# Patient Record
Sex: FEMALE | Race: WHITE | NOT HISPANIC OR LATINO | Employment: FULL TIME | RURAL
[De-identification: names, ages, dates, MRNs, and addresses within clinical notes are randomized per-mention and may not be internally consistent; named-entity substitution may affect disease eponyms.]

---

## 2018-05-04 ENCOUNTER — HISTORICAL (OUTPATIENT)
Dept: ADMINISTRATIVE | Facility: HOSPITAL | Age: 28
End: 2018-05-04

## 2018-05-08 LAB
LAB AP CLINICAL INFORMATION: NORMAL
LAB AP DIAGNOSIS - HISTORICAL: NORMAL
LAB AP GROSS PATHOLOGY - HISTORICAL: NORMAL
LAB AP SPECIMEN SUBMITTED - HISTORICAL: NORMAL

## 2022-02-08 ENCOUNTER — OFFICE VISIT (OUTPATIENT)
Dept: FAMILY MEDICINE | Facility: CLINIC | Age: 32
End: 2022-02-08
Payer: COMMERCIAL

## 2022-02-08 DIAGNOSIS — E28.2 POLYCYSTIC OVARIAN DISEASE: Primary | ICD-10-CM

## 2022-02-08 DIAGNOSIS — M06.9 RHEUMATOID ARTHRITIS INVOLVING MULTIPLE SITES, UNSPECIFIED WHETHER RHEUMATOID FACTOR PRESENT: ICD-10-CM

## 2022-02-08 DIAGNOSIS — M25.531 PAIN OF BOTH WRIST JOINTS: ICD-10-CM

## 2022-02-08 DIAGNOSIS — M25.532 PAIN OF BOTH WRIST JOINTS: ICD-10-CM

## 2022-02-08 PROCEDURE — 99214 PR OFFICE/OUTPT VISIT, EST, LEVL IV, 30-39 MIN: ICD-10-PCS | Mod: ,,, | Performed by: NURSE PRACTITIONER

## 2022-02-08 PROCEDURE — 99214 OFFICE O/P EST MOD 30 MIN: CPT | Mod: ,,, | Performed by: NURSE PRACTITIONER

## 2022-02-10 NOTE — PROGRESS NOTES
Keya Landin NP   [facility]  [address]  [place]     PATIENT NAME: Britney Wagner  : 1990  DATE: 22  MRN: [id number]      Billing Provider: Keya Landin NP  Level of Service:   Patient PCP Information     Provider PCP Type    Keya Landin NP General          Reason for Visit / Chief Complaint: Rheumatoid Arthritis (Pt states that she is having trouble with her arthritis again and is needing something to help with her pain. ) and Carpal Tunnel       Update PCP  Update Chief Complaint         History of Present Illness / Problem Focused Workflow     Britney Wagner presents to the clinic  stated she was diagnosed with RA at age 7, having trouble with arthritis, has not taken any arthritis med in years, also c/o carpal tunnel both wrists         Review of Systems     Review of Systems   Constitutional: Negative for chills, fatigue and fever.   HENT: Negative for nasal congestion, ear pain, facial swelling, hearing loss, mouth dryness, mouth sores, postnasal drip, rhinorrhea, sinus pressure/congestion and goiter.    Eyes: Negative for discharge and itching.   Respiratory: Negative for cough, shortness of breath and wheezing.    Cardiovascular: Negative for chest pain and leg swelling.   Gastrointestinal: Negative for abdominal pain, change in bowel habit and change in bowel habit.   Genitourinary: Negative for difficulty urinating, dysuria, enuresis, frequency, hematuria and urgency.   Musculoskeletal: Positive for arthralgias.   Neurological: Negative for dizziness, vertigo, syncope, weakness and headaches.   Psychiatric/Behavioral: Negative for decreased concentration.        Medical / Social / Family History     Past Medical History:   Diagnosis Date    Migraines        Past Surgical History:   Procedure Laterality Date    INDUCED       TONSILLECTOMY         Social History  Ms.      Family History  Ms.'s family history is not on file.    Medications and Allergies      Medications  No outpatient medications have been marked as taking for the 2/8/22 encounter (Office Visit) with Keya Landin NP.       Allergies  Review of patient's allergies indicates:  No Known Allergies    Physical Examination   There were no vitals filed for this visit.   Physical Exam  Constitutional:       Appearance: Normal appearance.   HENT:      Head: Normocephalic.      Right Ear: Tympanic membrane, ear canal and external ear normal.      Left Ear: Tympanic membrane, ear canal and external ear normal.      Nose: Nose normal.      Mouth/Throat:      Mouth: Mucous membranes are moist.      Pharynx: Oropharynx is clear.   Eyes:      Extraocular Movements: Extraocular movements intact.      Conjunctiva/sclera: Conjunctivae normal.      Pupils: Pupils are equal, round, and reactive to light.   Cardiovascular:      Rate and Rhythm: Normal rate and regular rhythm.      Pulses: Normal pulses.      Heart sounds: Normal heart sounds.   Pulmonary:      Effort: Pulmonary effort is normal.      Breath sounds: Normal breath sounds.   Abdominal:      General: Bowel sounds are normal.      Palpations: Abdomen is soft.   Musculoskeletal:         General: Tenderness (multiple joint pain, pain shoulders, elbows, wrist,hips, knees, also pain of wrist) present present. Normal range of motion.   Skin:     General: Skin is warm and dry.      Capillary Refill: Capillary refill takes less than 2 seconds.   Neurological:      General: No focal deficit present.      Mental Status: She is alert and oriented to person, place, and time.      Comments: Tinel signs positive, phalens positive, amanda wrists    Psychiatric:         Mood and Affect: Mood normal.         Behavior: Behavior normal.          Assessment and Plan (including Health Maintenance)      Problem List  Smart Sets  Document Outside HM   :    Plan:  will make referral for ob/gyn, rheumatologist and emg studies, meds as ordered, return to clinic in 1  monht    Rheumatoid arthritis, involving unspecified site, unspecified whether rheumatoid factor present  -     Ambulatory referral/consult to Rheumatology; Future; Expected date: 02/15/2022  -     dexamethasone injection 4 mg  -     methylPREDNISolone acetate injection 40 mg  -     methylPREDNISolone (MEDROL DOSEPACK) 4 mg tablet; use as directed  Dispense: 21 each; Refill: 0  -     meloxicam (MOBIC) 15 MG tablet; Take 1 tablet (15 mg total) by mouth once daily.  Dispense: 30 tablet; Refill: 5     PCOS (polycystic ovarian syndrome)  -     Ambulatory referral/consult to Gynecology; Future; Expected date: 02/15/2022     Pain of both wrist joints  -     Nerve conduction test; Future; Expected date: 02/15/2022        Health Maintenance Due   Topic Date Due    Hepatitis C Screening  Never done    Lipid Panel  Never done    COVID-19 Vaccine (1) Never done    HIV Screening  Never done    TETANUS VACCINE  Never done    Pap Smear  Never done    Influenza Vaccine (1) 09/01/2021       Problem List Items Addressed This Visit    None     Visit Diagnoses     Polycystic ovarian disease    -  Primary    Relevant Orders    Ambulatory referral/consult to Obstetrics / Gynecology    Pain of both wrist joints        Relevant Orders    EMG W/ ULTRASOUND AND NERVE CONDUCTION TEST 2 Extremities    Rheumatoid arthritis involving multiple sites, unspecified whether rheumatoid factor present        Relevant Orders    Ambulatory referral/consult to Rheumatology            The patient has no Health Maintenance topics of status Not Due    Procedures     Future Appointments   Date Time Provider Department Center   2/15/2022  4:15 PM [facility]        Follow up in about 1 week (around 2/15/2022).       Signature:  Keya Landin NP    Date of encounter: 2/8/22

## 2022-02-10 NOTE — PATIENT INSTRUCTIONS
Patient Education       Rheumatoid Arthritis Discharge Instructions   About this topic   Rheumatoid arthritis is also called RA. It is a long-term illness that causes problems with swelling of the lining of your joints. This leads to pain, stiffness, and problems moving. RA is caused by your own body attacking the joints. This is known as an autoimmune disorder. Doctors don't know what triggers each attack. RA can cause bone and joint damage after some time.  Treatment includes drugs and therapy. Surgery may be needed if the bones and joints have been badly damaged.  What care is needed at home?   · Ask your doctor what you need to do when you go home. Make sure you ask questions if you do not understand what the doctor says. This way you will know what you need to do.  · Your doctor may tell you to use crutches, a walker, or a cane if your legs or knees are affected.  · If the pain is severe, your doctor may inject a drug into the affected joint or joints to treat the swelling. Keep the injection site clean and dry for 24 hours. You may feel the effect of the drug after 1 to 7 days.  · Your doctor may tell you to use ice or heat to help with pain.  ? Place an ice pack or a bag of frozen peas wrapped in a towel over the painful part. Never put ice right on the skin. Do not leave the ice on more than 10 to 15 minutes at a time.  ? Put a heating pad on the painful part for no more than 20 minutes at a time. Never go to sleep with a heating pad on as this can cause burns.  ? Paraffin baths are a hot wax treatment that you may be able to use on your hands or feet at home. You may buy units for this or you can make your own. You must be very careful not to get the wax too hot as this can cause burns. Follow instructions very carefully.  · You may wrap the swollen joint to help with swelling. You may use an elastic bandage. You may need a special compression glove if your fingers are involved.  · Rest the painful joint.  Talk with your doctor about activities that may help your pain.  · Prop the affected part on 2 to 3 pillows when you rest. This may help lessen the swelling.  · If you are overweight, try to lose weight. This can put less stress on your joints.  What follow-up care is needed?   · Your doctor may ask you to make visits to the office to check on your progress. Be sure to keep these visits.  · You may also need to see:  ? A physical therapist (PT). The PT will suggest an exercise program to keep joints flexible and strong. The PT may also do treatments to lessen pain and swelling.  ? An occupational therapist (OT). The OT will help you learn new ways to take care of yourself in order to make your daily activities easier. The OT may also make you a special splint if your hand is affected.  ? A mental health therapist. They will help you adjust to the changes in your life while dealing with your health problem. They may also help you with low mood.  ? A dietitian. This person will help you with special dietary needs or restrictions specific to your health problem.  What drugs may be needed?   The doctor may order drugs to:  · Help with pain and swelling  · Target parts of the immune system and slow the progression of the condition. These are called DMARDs or disease-modifying anti-rheumatic drugs.  Will physical activity be limited?   · When your joints are swollen and sore, you may want to do only limited activities. You need to rest and avoid overusing the affected area.  · When you are feeling better, it is important to stay active. Talk to your doctor about the best kind of activities for you.  · You may have problems holding or grasping things if your hands or fingers are affected.  · Walking or running may be hard if your legs, knees, hip, or feet are affected. Swimming is a good type of exercise that is easy on your joints.  What problems could happen?   · Signs of RA can return  · Damage to the bones and  joints  · Joints may look deformed over time  · Weaker bones  · Heart problems  · Lung problems  · Muscle weakness  · Muscle pain  · Low blood counts  · Vision and eye problems  What can be done to prevent this health problem?   There are still no known ways to prevent this health problem. Early treatment may help lower the risk for more joint problems.  When do I need to call the doctor?   · Fever of 100.4°F (38°C) or higher  · Swelling gets worse  · Pain is very bad and is not relieved by the drugs given to you  · Breathing problems  · Heartbeat feels too fast  Teach Back: Helping You Understand   The Teach Back Method helps you understand the information we are giving you. After you talk with the staff, tell them in your own words what you learned. This helps to make sure the staff has described each thing clearly. It also helps to explain things that may have been confusing. Before going home, make sure you can do these:  · I can tell you about my condition.  · I can tell you what may help ease my pain.  · I can tell you what I will do if I have more swelling or my pain is very bad.  Where can I learn more?   American College of Rheumatology  http://www.rheumatology.org/I-Am-A/Patient-Caregiver/Diseases-Conditions/Rheumatoid-Arthritis   [employer] [place] [employer]  https://www.niams.nih.gov/health-topics/rheumatoid-arthritis   NHS Choices  http://www.nhs.uk/conditions/Rheumatoid-arthritis/Pages/Introduction.aspx   Last Reviewed Date   2019-11-21  Consumer Information Use and Disclaimer   This information is not specific medical advice and does not replace information you receive from your health care provider. This is only a brief summary of general information. It does NOT include all information about conditions, illnesses, injuries, tests, procedures, treatments, therapies, discharge instructions or life-style choices that may apply to you. You must talk with your health care provider for complete  information about your health and treatment options. This information should not be used to decide whether or not to accept your health care providers advice, instructions or recommendations. Only your health care provider has the knowledge and training to provide advice that is right for you.  Copyright   Copyright © 2021 UpToDate, Inc. and its affiliates and/or licensors. All rights reserved.  Patient Education       Carpal Tunnel Exercises   About this topic   Carpal tunnel syndrome is a very common health problem. It is most often caused by doing hand or wrist movements over and over. It can also be caused by using the lower arm muscles too much.  The carpal tunnel is the small area in your wrist that your median nerve runs through. A tough band of tissues called a ligament holds everything in place over the carpal tunnel. Your median nerve runs from your neck through your lower arm into your hand. If this nerve is squeezed at the wrist area, you may feel pain and have other signs. Your hand, fingers, and wrist may feel weak, numb, or tingly. This is called carpal tunnel syndrome.  Your doctor may want you to try exercises to help your signs. Other times, you will do these exercises after surgery.  General   Before starting with a program, ask your doctor if you are healthy enough to do these exercises. Your doctor may have you work with a  or physical therapist to make a safe exercise program to meet your needs.  Stretching Exercises   Stretching exercises keep your muscles flexible. They also stop them from getting tight. Start by doing each of these stretches 2 to 3 times. In order for your body to make changes, you will need to hold these stretches for 20 to 30 seconds. Repeat each exercise 2 to 3 times each day. Do all exercises slowly.  · Wrist stretches bending back ? Straighten your elbow and have your palm facing up. Keeping your elbow straight, bend your wrist back so that your fingers are now  pointing to the floor. Grab your hand with your other hand and push back the wrist until you feel a stretch. If you just had surgery, you should not do this exercise until your therapist or doctor tells you it is OK.  Strengthening Exercises   Strengthening exercises keep your muscles firm and strong. Sit while doing these exercises. Be sure to use good posture. Start by repeating each exercise 2 to 3 times. Work up to doing each exercise 10 times. Try to do the exercises 2 to 3 times each day. Do all exercises slowly.  · Tendon gliding exercises using 4 positions ? Start by holding your hand with your fingers straight. Then, bend only the last two joints of your fingers and move your fingers into a hook or claw position. Next, straighten your fingers and bend your knuckles to make a flat table top position. This is also called the duckbill position. Last, make a full fist. Moving your hand into all 4 positions is one exercise.  · Wrist exercises:  ? Side-to-side ? Hold one arm still using your other hand. Move your hand from side to side.  ? Up and down ? Hold one arm still using your other hand. Bend your wrist up and down.  · Wrist circles ? Move each wrist in a [place] in one direction. Now, move each wrist in a [place] in the other direction.           What will the results be?   · Less pain, pressure, stiffness, and swelling in your wrist and hand  · Ease numbness and tingling in your hand and fingers  · Increased blood flow to the nerves, muscles, and joints of your wrist and hand to help healing  · Increased hand and  strength  · Keep your muscles and joints strong and flexible  Helpful tips   · Stay active and work out to keep your muscles strong and flexible.  · Be sure you do not hold your breath when exercising. This can raise your blood pressure. If you tend to hold your breath, try counting out loud when exercising. If any exercise bothers you, stop right away.  · Always warm up before stretching.  Heated muscles stretch much easier than cool muscles. Stretching cool muscles can lead to injury.  · Try walking and swinging your arms at an easy pace for a few minutes to warm up your muscles. Do this again after exercising.  · Never bounce when doing stretches.  · Doing exercises before a meal may be a good way to get into a routine.  · Exercise may be slightly uncomfortable, but you should not have sharp pains. If you do get sharp pains, stop what you are doing. If the sharp pains continue, call your doctor.  Where can I learn more?   American Academy of Orthopaedic Surgeons  https://orthoinfo.aaos.org/en/recovery/carpal-tunnel-syndrome-therapeutic-exercise-program/   Last Reviewed Date   2021-05-10  Consumer Information Use and Disclaimer   This information is not specific medical advice and does not replace information you receive from your health care provider. This is only a brief summary of general information. It does NOT include all information about conditions, illnesses, injuries, tests, procedures, treatments, therapies, discharge instructions or life-style choices that may apply to you. You must talk with your health care provider for complete information about your health and treatment options. This information should not be used to decide whether or not to accept your health care providers advice, instructions or recommendations. Only your health care provider has the knowledge and training to provide advice that is right for you.  Copyright   Copyright © 2021 UpToDate, Inc. and its affiliates and/or licensors. All rights reserved.

## 2022-02-15 ENCOUNTER — OFFICE VISIT (OUTPATIENT)
Dept: FAMILY MEDICINE | Facility: CLINIC | Age: 32
End: 2022-02-15
Payer: COMMERCIAL

## 2022-02-15 VITALS
HEIGHT: 64 IN | HEART RATE: 93 BPM | TEMPERATURE: 98 F | WEIGHT: 187.5 LBS | BODY MASS INDEX: 32.01 KG/M2 | RESPIRATION RATE: 18 BRPM | OXYGEN SATURATION: 99 % | DIASTOLIC BLOOD PRESSURE: 80 MMHG | SYSTOLIC BLOOD PRESSURE: 124 MMHG

## 2022-02-15 DIAGNOSIS — E28.2 POLYCYSTIC OVARIAN DISEASE: ICD-10-CM

## 2022-02-15 DIAGNOSIS — M25.531 PAIN OF BOTH WRIST JOINTS: Primary | ICD-10-CM

## 2022-02-15 DIAGNOSIS — M06.9 RHEUMATOID ARTHRITIS INVOLVING MULTIPLE SITES, UNSPECIFIED WHETHER RHEUMATOID FACTOR PRESENT: ICD-10-CM

## 2022-02-15 DIAGNOSIS — M25.532 PAIN OF BOTH WRIST JOINTS: Primary | ICD-10-CM

## 2022-02-15 PROCEDURE — 3079F PR MOST RECENT DIASTOLIC BLOOD PRESSURE 80-89 MM HG: ICD-10-PCS | Mod: ,,, | Performed by: NURSE PRACTITIONER

## 2022-02-15 PROCEDURE — 3074F PR MOST RECENT SYSTOLIC BLOOD PRESSURE < 130 MM HG: ICD-10-PCS | Mod: ,,, | Performed by: NURSE PRACTITIONER

## 2022-02-15 PROCEDURE — 1159F MED LIST DOCD IN RCRD: CPT | Mod: ,,, | Performed by: NURSE PRACTITIONER

## 2022-02-15 PROCEDURE — 1159F PR MEDICATION LIST DOCUMENTED IN MEDICAL RECORD: ICD-10-PCS | Mod: ,,, | Performed by: NURSE PRACTITIONER

## 2022-02-15 PROCEDURE — 3074F SYST BP LT 130 MM HG: CPT | Mod: ,,, | Performed by: NURSE PRACTITIONER

## 2022-02-15 PROCEDURE — 3079F DIAST BP 80-89 MM HG: CPT | Mod: ,,, | Performed by: NURSE PRACTITIONER

## 2022-02-15 PROCEDURE — 3008F PR BODY MASS INDEX (BMI) DOCUMENTED: ICD-10-PCS | Mod: ,,, | Performed by: NURSE PRACTITIONER

## 2022-02-15 PROCEDURE — 3008F BODY MASS INDEX DOCD: CPT | Mod: ,,, | Performed by: NURSE PRACTITIONER

## 2022-02-15 PROCEDURE — 99214 PR OFFICE/OUTPT VISIT, EST, LEVL IV, 30-39 MIN: ICD-10-PCS | Mod: ,,, | Performed by: NURSE PRACTITIONER

## 2022-02-15 PROCEDURE — 99214 OFFICE O/P EST MOD 30 MIN: CPT | Mod: ,,, | Performed by: NURSE PRACTITIONER

## 2022-02-15 RX ORDER — MELOXICAM 15 MG/1
15 TABLET ORAL DAILY
COMMUNITY
Start: 2022-02-08 | End: 2023-01-26

## 2022-02-15 RX ORDER — METHYLPREDNISOLONE 4 MG/1
4 TABLET ORAL DAILY
COMMUNITY
Start: 2022-02-08 | End: 2022-11-15

## 2022-02-15 RX ORDER — METFORMIN HYDROCHLORIDE 500 MG/1
500 TABLET ORAL DAILY
COMMUNITY
End: 2023-01-26

## 2022-02-15 RX ORDER — CHOLECALCIFEROL (VITAMIN D3) 125 MCG
5000 CAPSULE ORAL DAILY
COMMUNITY
End: 2023-01-26

## 2022-02-15 RX ORDER — SPIRONOLACTONE 50 MG/1
50 TABLET, FILM COATED ORAL DAILY
COMMUNITY
End: 2023-01-26

## 2022-02-15 NOTE — PROGRESS NOTES
Keya Landin NP   [facility]  [address] [address]  [place]     PATIENT NAME: Britney Jarrett  : 1990  DATE: 2/15/22  MRN: [id number]      Billing Provider: Keya Landin NP  Level of Service:   Patient PCP Information     Provider PCP Type    Keya Landin NP General          Reason for Visit / Chief Complaint: Follow-up (One week follow up ), Rheumatoid Arthritis, and PCOS       Update PCP  Update Chief Complaint         History of Present Illness / Problem Focused Workflow     Britney Jarrett presents to the clinic here for eval of RA, stated that her arms and hands are feeling better, also for eval of PCOS, referrals for rheumatologist pending, also emg studies. [facility] called her today      Review of Systems     Review of Systems   Constitutional: Negative for chills, fatigue and fever.   HENT: Negative for nasal congestion, ear pain, facial swelling, hearing loss, mouth dryness, mouth sores, postnasal drip, rhinorrhea, sinus pressure/congestion and goiter.    Eyes: Negative for discharge and itching.   Respiratory: Negative for cough, shortness of breath and wheezing.    Cardiovascular: Negative for chest pain and leg swelling.   Gastrointestinal: Negative for abdominal pain, change in bowel habit and change in bowel habit.   Genitourinary: Negative for difficulty urinating, dysuria, enuresis, frequency, hematuria and urgency.   Musculoskeletal: Negative for back pain. Arthralgias: hands and arms with pain.   Neurological: Negative for dizziness, vertigo, syncope, weakness and headaches.   Psychiatric/Behavioral: Negative for decreased concentration.        Medical / Social / Family History     Past Medical History:   Diagnosis Date    Migraines        Past Surgical History:   Procedure Laterality Date    INDUCED       TONSILLECTOMY         Social History  Ms.  reports that she has never smoked. She has never used smokeless tobacco. She reports previous alcohol use. She  "reports that she does not use drugs.    Family History  Ms.'s family history is not on file.    Medications and Allergies     Medications  Outpatient Medications Marked as Taking for the 2/15/22 encounter (Office Visit) with Keya Landin NP   Medication Sig Dispense Refill    cholecalciferol, vitamin D3, 125 mcg (5,000 unit) capsule Take 5,000 Units by mouth once daily.      meloxicam (MOBIC) 15 MG tablet Take 15 mg by mouth once daily.      methylPREDNISolone (MEDROL DOSEPACK) 4 mg tablet Take 4 mg by mouth once daily.         Allergies  Review of patient's allergies indicates:  No Known Allergies    Physical Examination     Vitals:    02/15/22 1620   BP: 124/80   BP Location: Right arm   Patient Position: Sitting   BP Method: Medium (Manual)   Pulse: 93   Resp: 18   Temp: 98 °F (36.7 °C)   TempSrc: Oral   SpO2: 99%   Weight: 85 kg (187 lb 8 oz)   Height: 5' 4" (1.626 m)      Physical Exam  Constitutional:       Appearance: Normal appearance.   HENT:      Head: Normocephalic.      Right Ear: Tympanic membrane, ear canal and external ear normal.      Left Ear: Tympanic membrane, ear canal and external ear normal.      Nose: Nose normal.      Mouth/Throat:      Mouth: Mucous membranes are moist.      Pharynx: Oropharynx is clear.   Eyes:      Extraocular Movements: Extraocular movements intact.      Conjunctiva/sclera: Conjunctivae normal.      Pupils: Pupils are equal, round, and reactive to light.   Cardiovascular:      Rate and Rhythm: Normal rate and regular rhythm.      Pulses: Normal pulses.      Heart sounds: Normal heart sounds.   Pulmonary:      Effort: Pulmonary effort is normal.      Breath sounds: Normal breath sounds.   Abdominal:      General: Bowel sounds are normal.      Palpations: Abdomen is soft.   Musculoskeletal:         General: Tenderness (c/o pain of hands and arms, but much better, full rom to ext. ) present. Normal range of motion.   Skin:     General: Skin is warm and dry.      " Capillary Refill: Capillary refill takes less than 2 seconds.   Neurological:      General: No focal deficit present.      Mental Status: She is alert and oriented to person, place, and time.   Psychiatric:         Mood and Affect: Mood normal.         Behavior: Behavior normal.          Assessment and Plan (including Health Maintenance)      Problem List  Smart Sets  Document Outside HM   :    Plan: cont all meds as ordered, return to clinic as needed. And as scheduled, keep [facility] specialists when available    There are no diagnoses linked to this encounter.        Health Maintenance Due   Topic Date Due    Hepatitis C Screening  Never done    Lipid Panel  Never done    COVID-19 Vaccine (1) Never done    HIV Screening  Never done    TETANUS VACCINE  Never done    Pap Smear  Never done    Influenza Vaccine (1) Never done       Problem List Items Addressed This Visit    None           The patient has no Health Maintenance topics of status Not Due    Procedures     Future Appointments   Date Time Provider Department Center   3/17/2022 10:20 AM Keya Landin NP [facility]   5/16/2022  3:00 PM Keya Landin NP [facility]        Follow up in about 3 months (around 5/15/2022) for f\u.       Signature:  Keya Landin NP    Date of encounter: 2/15/22

## 2022-02-15 NOTE — PATIENT INSTRUCTIONS
"Patient Education       Polycystic Ovary Syndrome   The Basics   Written by the doctors and editors at [facility]   What is polycystic ovary syndrome? -- Polycystic ovary syndrome, or "PCOS," is a condition that can cause irregular periods, acne (oily skin and pimples), extra facial hair, or hair loss from the head. The condition can also make it hard to get pregnant. It is very common - about 5 to 8 percent of all women have PCOS. Most, but not all, people with PCOS are overweight or obese.  What causes PCOS? -- In PCOS, the ovaries do not work normally and produce too much testosterone. Testosterone is called a "male hormone," but all people have some testosterone. Normally the ovaries produce very small amounts, but in PCOS, they make more.  About once a month, the ovaries are supposed to make a structure called a "follicle" (figure 1). As the follicle grows, it makes hormones. Then, it releases an egg. This is called "ovulation." But in people with PCOS, the ovary makes many small follicles instead of one big one. Hormone levels can get out of balance. And ovulation doesn't happen every month the way it is supposed to. Doctors aren't sure why this happens in some cases.  What are the symptoms of PCOS? -- Symptoms can include:  · Having fewer than 8 periods a year  · Growing thick, dark hair on the upper lip, chin, sideburn area, chest, or belly  · Acne (oily skin and pimples on their face)  · Hair loss from the head  · Trouble getting pregnant without medical help  · Weight gain and obesity (although not everyone with PCOS has this problem)  Should I see a doctor or nurse, even if my symptoms are mild? -- Yes. PCOS increases your risk of other health problems, including:  · Diabetes (high blood sugar)  · High cholesterol levels  · Sleep apnea, a sleep disorder that causes people to briefly stop breathing while they sleep  · Depression or anxiety  · Eating disorders, such as binge eating or bulimia  · Losing " interest in sex   Are there tests I should have? -- Your doctor or nurse will decide which tests you should have based on your age, symptoms, and individual situation. Possible tests include:  · Blood tests to measure levels of hormones, blood sugar, and cholesterol  · A pregnancy test if you have missed any periods  · Pelvic ultrasound - This test uses sound waves to make a picture of your uterus and ovaries. Doctors sometimes use this test to help figure out if you have polycystic ovaries.  How is PCOS treated? -- The most common treatment is to take birth control pills. But there are other treatments than can help with symptoms, too.  · Birth control pills - This is the main treatment for PCOS. The pills don't cure the condition. But they can improve many of its symptoms, like irregular periods, acne, and facial hair. Birth control pills also lower your risk of cancer of the uterus.  · Anti-androgens - These medicines block hormones that cause some PCOS symptoms like acne and facial hair growth. Spironolactone (brand name: Aldactone) is the one that many doctors use.  · Progestin - This hormone can make your periods regular, but only if you take it regularly. It also lowers the risk of cancer of the uterus. Most doctors use medroxyprogesterone (brand name: Provera) or natural progesterone (brand name: Prometrium).  · Metformin (brand name: Glucophage) - This medicine can help make your periods more regular. But it works only in about half of the people who try it. In people with diabetes, this medicine helps to lower blood sugar levels.   · Medicated skin lotion or antibiotics to treat acne  · Laser therapy or electrolysis to remove extra hair  Is there anything I can do on my own to treat the condition? -- Yes. If you are overweight or obese, losing weight can improve many of your symptoms. Losing just 5 percent of your body weight can help a lot. As an example, for a person who weighs 200 pounds, this would  mean 10 pounds of weight loss.  What if I want to get pregnant? -- Most people with PCOS are able to get pregnant, but it is usually easier for those who are not overweight. If you are overweight, losing weight can help make your periods more regular and improve your chances of getting pregnant. If you lose weight but your periods are still irregular, your doctor can give you medicines to help you ovulate and improve your chances of getting pregnant.  What will my life be like? -- It is possible to live a full and normal life with PCOS. But it is important to see a doctor. Treatments will help your symptoms and protect you from other diseases.  It's also important to talk to your doctor or nurse if you feel depressed or anxious, think you might have an eating disorder, or have problems with sex. There are treatments that can help with these problems, too.  All topics are updated as new evidence becomes available and our peer review process is complete.  This topic retrieved from [employer] on: Sep 21, 2021.  Topic 69557 Version 8.0  Release: 29.4.2 - C29.263  © 2021 UpToDate, Inc. and/or its affiliates. All rights reserved.  figure 1: Female reproductive anatomy     These are the internal organs that make up the female reproductive system.  Graphic 30330 Version 6.0    Consumer Information Use and Disclaimer   This information is not specific medical advice and does not replace information you receive from your health care provider. This is only a brief summary of general information. It does NOT include all information about conditions, illnesses, injuries, tests, procedures, treatments, therapies, discharge instructions or life-style choices that may apply to you. You must talk with your health care provider for complete information about your health and treatment options. This information should not be used to decide whether or not to accept your health care provider's advice, instructions or recommendations. Only  your health care provider has the knowledge and training to provide advice that is right for you. The use of this information is governed by the [employer] End User License Agreement, available at [email or web address]/[email or web address].The use of [employer] content is governed by the [employer] Terms of Use. ©2021 UpToDate, Inc. All rights reserved.  Copyright   © 2021 UpToDate, Inc. and/or its affiliates. All rights reserved.  Patient Education       Joint Pain   About this topic   Joint pain is sometimes called arthralgia. You have pain where one or more bones are connected.       What are the causes?   Joint pain may be caused by:  · Injury  · Infection  · Health problems like an immune disorder, or other illness  · Problems with cartilage, ligaments, or tendons  What can make this more likely to happen?   · Older age  · Doing the same motion over and over with a joint  · Being overweight  · Injuries  What are the main signs?   You may have mild or very bad pain. The pain may be constant or it may come and go. You may have trouble moving the joint that hurts. The pain may burn, stab, or throb. It may be sharp or dull. Your joint may feel stiff, numb, or tingly. It may be hard to move or put weight on a joint if the pain is bad.  How does the doctor diagnose this health problem?   The doctor will ask you questions about your history and do an exam. The doctor will check your joints with care and may order:  · Lab tests  · X-rays  How does the doctor treat this health problem?   Your care is based on what is causing your pain. The doctor will also treat it based on how bad your pain is and where your pain is found on your body. The doctor may suggest you:  · Limit your activity.  · Do stretching exercises.  · Your doctor may want you to start an exercise program. Some kinds of exercise, like swimming, may help ease pain. It can keep your muscles strong and helps you maintain a healthy  weight.  · Place an ice pack or a bag of frozen peas wrapped in a towel over the painful part. Never put ice right on the skin. Do not leave the ice on more than 10 to 15 minutes at a time.  · Use heat. Put a heating pad on your painful part for no more than 20 minutes at a time. Never go to sleep with a heating pad on as this can cause burns.  What drugs may be needed?   The doctor may order drugs to:  · Help with pain and swelling  Your doctor may instruct you to take:  · Drugs like ibuprofen or naproxen for pain. These are all nonsteroidal anti-inflammatory drugs (NSAIDS). Do not take more than one type of these drugs at the same time.  · Drugs for pain such as acetaminophen.  The doctor may give you a shot of an anti-inflammatory drug called a corticosteroid. This will help with swelling.  Helpful tips   · Stay active and work out to keep your muscles strong and flexible.  · Keep a healthy weight. Being heavy puts more stress on your joints. This makes them more likely to hurt.  · Warm up slowly and stretch before you work out. Use good ways to train, such as slowly adding to how far you run. Do not work out if you are overly tired. Take extra care if working out in cold weather.  Last Reviewed Date   2020-10-12  Consumer Information Use and Disclaimer   This information is not specific medical advice and does not replace information you receive from your health care provider. This is only a brief summary of general information. It does NOT include all information about conditions, illnesses, injuries, tests, procedures, treatments, therapies, discharge instructions or life-style choices that may apply to you. You must talk with your health care provider for complete information about your health and treatment options. This information should not be used to decide whether or not to accept your health care providers advice, instructions or recommendations. Only your health care provider has the knowledge and  training to provide advice that is right for you.  Copyright   Copyright © 2021 [employer] and its affiliates and/or licensors. All rights reserved.

## 2022-11-15 ENCOUNTER — HOSPITAL ENCOUNTER (EMERGENCY)
Facility: HOSPITAL | Age: 32
Discharge: HOME | End: 2022-11-15
Payer: COMMERCIAL

## 2022-11-15 VITALS
WEIGHT: 169 LBS | SYSTOLIC BLOOD PRESSURE: 101 MMHG | HEIGHT: 62 IN | OXYGEN SATURATION: 96 % | BODY MASS INDEX: 31.1 KG/M2 | DIASTOLIC BLOOD PRESSURE: 48 MMHG | HEART RATE: 105 BPM | RESPIRATION RATE: 18 BRPM | TEMPERATURE: 103 F

## 2022-11-15 DIAGNOSIS — R11.2 NAUSEA AND VOMITING, UNSPECIFIED VOMITING TYPE: ICD-10-CM

## 2022-11-15 DIAGNOSIS — G43.901 MIGRAINE WITH STATUS MIGRAINOSUS, NOT INTRACTABLE, UNSPECIFIED MIGRAINE TYPE: ICD-10-CM

## 2022-11-15 DIAGNOSIS — R21 RASH: Primary | ICD-10-CM

## 2022-11-15 PROCEDURE — 96375 TX/PRO/DX INJ NEW DRUG ADDON: CPT

## 2022-11-15 PROCEDURE — 99284 EMERGENCY DEPT VISIT MOD MDM: CPT | Mod: 25

## 2022-11-15 PROCEDURE — 25000003 PHARM REV CODE 250: Performed by: REGISTERED NURSE

## 2022-11-15 PROCEDURE — 96365 THER/PROPH/DIAG IV INF INIT: CPT

## 2022-11-15 PROCEDURE — 99284 EMERGENCY DEPT VISIT MOD MDM: CPT | Mod: ,,, | Performed by: REGISTERED NURSE

## 2022-11-15 PROCEDURE — 63600175 PHARM REV CODE 636 W HCPCS: Performed by: REGISTERED NURSE

## 2022-11-15 PROCEDURE — 96361 HYDRATE IV INFUSION ADD-ON: CPT

## 2022-11-15 PROCEDURE — 99284 PR EMERGENCY DEPT VISIT,LEVEL IV: ICD-10-PCS | Mod: ,,, | Performed by: REGISTERED NURSE

## 2022-11-15 RX ORDER — KETOROLAC TROMETHAMINE 30 MG/ML
30 INJECTION, SOLUTION INTRAMUSCULAR; INTRAVENOUS
Status: COMPLETED | OUTPATIENT
Start: 2022-11-15 | End: 2022-11-15

## 2022-11-15 RX ORDER — METHYLPREDNISOLONE 4 MG/1
TABLET ORAL
Qty: 1 EACH | Refills: 0 | Status: SHIPPED | OUTPATIENT
Start: 2022-11-15 | End: 2022-12-06

## 2022-11-15 RX ORDER — TRIPROLIDINE/PSEUDOEPHEDRINE 2.5MG-60MG
600 TABLET ORAL
Status: COMPLETED | OUTPATIENT
Start: 2022-11-15 | End: 2022-11-15

## 2022-11-15 RX ORDER — BENZONATATE 100 MG/1
100 CAPSULE ORAL 3 TIMES DAILY PRN
Qty: 15 CAPSULE | Refills: 0 | Status: SHIPPED | OUTPATIENT
Start: 2022-11-15 | End: 2022-11-25

## 2022-11-15 RX ORDER — FAMOTIDINE 10 MG/ML
20 INJECTION INTRAVENOUS
Status: COMPLETED | OUTPATIENT
Start: 2022-11-15 | End: 2022-11-15

## 2022-11-15 RX ORDER — METHYLPREDNISOLONE SOD SUCC 125 MG
125 VIAL (EA) INJECTION
Status: COMPLETED | OUTPATIENT
Start: 2022-11-15 | End: 2022-11-15

## 2022-11-15 RX ORDER — PROMETHAZINE HYDROCHLORIDE 25 MG/1
25 SUPPOSITORY RECTAL EVERY 6 HOURS PRN
Qty: 10 SUPPOSITORY | Refills: 0 | Status: SHIPPED | OUTPATIENT
Start: 2022-11-15 | End: 2023-01-26

## 2022-11-15 RX ADMIN — PROMETHAZINE HYDROCHLORIDE 25 MG: 25 INJECTION INTRAMUSCULAR; INTRAVENOUS at 08:11

## 2022-11-15 RX ADMIN — IBUPROFEN 600 MG: 100 SUSPENSION ORAL at 08:11

## 2022-11-15 RX ADMIN — SODIUM CHLORIDE 1000 ML: 9 INJECTION, SOLUTION INTRAVENOUS at 07:11

## 2022-11-15 RX ADMIN — KETOROLAC TROMETHAMINE 30 MG: 30 INJECTION, SOLUTION INTRAMUSCULAR; INTRAVENOUS at 08:11

## 2022-11-15 RX ADMIN — FAMOTIDINE 20 MG: 10 INJECTION INTRAVENOUS at 07:11

## 2022-11-15 RX ADMIN — METHYLPREDNISOLONE SODIUM SUCCINATE 125 MG: 125 INJECTION, POWDER, FOR SOLUTION INTRAMUSCULAR; INTRAVENOUS at 07:11

## 2022-11-16 ENCOUNTER — TELEPHONE (OUTPATIENT)
Dept: EMERGENCY MEDICINE | Facility: HOSPITAL | Age: 32
End: 2022-11-16
Payer: COMMERCIAL

## 2022-11-16 NOTE — ED TRIAGE NOTES
Pt presents with complaint of rash to both bilateral lower arms and worsening of abdominal pain. Tested positive for flu 11/14. Symptoms began 11/12. Rec'd Toradol and phenergan shot yesterday at clinic. Script for tamiflu and zofran given. Sample of Migraine medication given, pt took one last night. Rash began earlier today and has taken 30ml of benadryl pta with no relief.

## 2022-11-16 NOTE — DISCHARGE INSTRUCTIONS
-Do not over heat by covering heavily with blankets  -Small sips of cool fluids every 5-10 minutes  -Alternate Tylenol and Motrin every 4 hours as needed for pain and fever  -Stop Nurtec and inform prescribing provider of rash  -Use Phenergan suppository as directed only if Zofran does not resolve nausea/vomiting

## 2022-11-16 NOTE — ED PROVIDER NOTES
"Encounter Date: 11/15/2022       History     Chief Complaint   Patient presents with    Rash    Abdominal Pain     Britney Jarrett is a 31 yo WF that presents with c/o rash to bilateral arms since this morning.  Rash is not raised nor does it itch or hurt.  States it "feels hot".  Denies SOB or chest tightness.  Able to maintain oral secretions and speak in full sentences without difficulty.  She also reports a migraine, nausea, and vomiting since Saturday.  Pt states she was diagnosed with influenza yesterday.  She does report a history of migraines.  Luisana Christianson NP at [facility] gave her a shot of Toradol and shot of Phenergan for her migraine yesteday.  She was given a prescription for Tamiflu, a prescription for Zofran, and a sample of Nurtec ODT for her migraine.  She states she still has a migraine and is still vomiting.  She has not taken any medication for her fever "I will just throw it back up".    The history is provided by the patient.   Review of patient's allergies indicates:  No Known Allergies  Past Medical History:   Diagnosis Date    Migraine headache     Migraines      Past Surgical History:   Procedure Laterality Date     SECTION      INDUCED       TONSILLECTOMY       History reviewed. No pertinent family history.  Social History     Tobacco Use    Smoking status: Never    Smokeless tobacco: Never   Substance Use Topics    Alcohol use: Not Currently    Drug use: Never     Review of Systems   Constitutional:  Positive for activity change, appetite change, chills, fatigue and fever.   HENT:  Negative for congestion, drooling, ear pain, postnasal drip, rhinorrhea, sneezing, sore throat and trouble swallowing.    Eyes: Negative.    Respiratory:  Positive for cough and chest tightness. Negative for shortness of breath and wheezing.    Cardiovascular:  Negative for chest pain.   Gastrointestinal:  Positive for nausea and vomiting. Negative for abdominal pain, constipation " "and diarrhea.   Endocrine: Negative.    Genitourinary: Negative.    Musculoskeletal:  Positive for myalgias.   Skin:  Positive for rash (bialteral rash to arms).   Neurological:  Positive for headaches. Negative for dizziness, seizures, syncope, weakness, light-headedness and numbness.   Psychiatric/Behavioral: Negative.       Physical Exam     Initial Vitals [11/15/22 1939]   BP Pulse Resp Temp SpO2   133/71 (!) 114 19 (!) 102.1 °F (38.9 °C) 100 %      MAP       --         Physical Exam    Constitutional: She appears well-developed and well-nourished. She is not diaphoretic. No distress.   HENT:   Head: Normocephalic and atraumatic.   Right Ear: External ear normal.   Left Ear: External ear normal.   Nose: Nose normal.   Mouth/Throat: Uvula is midline and oropharynx is clear and moist. No uvula swelling. No posterior oropharyngeal edema or posterior oropharyngeal erythema.   Eyes: EOM are normal. Pupils are equal, round, and reactive to light.   Neck: Neck supple.   Normal range of motion.  Cardiovascular:  Regular rhythm, normal heart sounds and intact distal pulses.           Pulmonary/Chest: Breath sounds normal. No respiratory distress. She has no wheezes. She exhibits tenderness.   Abdominal: Abdomen is soft. Bowel sounds are normal.   Musculoskeletal:         General: Normal range of motion.      Cervical back: Normal range of motion and neck supple.      Comments: Generalized body aches     Neurological: She is alert and oriented to person, place, and time. She has normal strength. GCS score is 15. GCS eye subscore is 4. GCS verbal subscore is 5. GCS motor subscore is 6.   Skin: Skin is warm, dry and intact. Capillary refill takes less than 2 seconds. Rash noted.   Red, petechial appearing rash noted to bilateral arms since this morning.  Denies itching or pain states "it feels hot".   Psychiatric: She has a normal mood and affect. Her behavior is normal. Judgment and thought content normal.       Medical " "Screening Exam   See Full Note    ED Course   Procedures  Labs Reviewed - No data to display       Imaging Results    None          Medications   methylPREDNISolone sodium succinate injection 125 mg (125 mg Intravenous Given 11/15/22 1944)   famotidine (PF) injection 20 mg (20 mg Intravenous Given 11/15/22 1942)   sodium chloride 0.9% bolus 1,000 mL (0 mLs Intravenous Stopped 11/15/22 2043)   ketorolac injection 30 mg (30 mg Intravenous Given 11/15/22 2011)   promethazine (PHENERGAN) 25 mg in dextrose 5 % 50 mL IVPB (0 mg Intravenous Stopped 11/15/22 2035)   ibuprofen 100 mg/5 mL suspension 600 mg (600 mg Oral Given 11/15/22 2030)     Medical Decision Making:   ED Management:  -2130:  Rash has resolved.  Pt states headache is "almost gone".  Temp down to 100.6 F oral.  Will prescribe Medrol Dose Pack and phenergan suppositories in case the Zofran she has at home is unable to stay down.  Tessalon Perles for cough.  Instructed on clear liquid diet and advance as tolerated.  Tylenol and Motrin alternated every other 4 hours as needed for fever and pain.  Stop Nurtec and inform provider of possible reaction.                  Clinical Impression:   Final diagnoses:  [R21] Rash (Primary)  [R11.2] Nausea and vomiting, unspecified vomiting type  [G43.901] Migraine with status migrainosus, not intractable, unspecified migraine type      ED Disposition Condition    Discharge Stable          ED Prescriptions       Medication Sig Dispense Start Date End Date Auth. Provider    methylPREDNISolone (MEDROL DOSEPACK) 4 mg tablet Take as directed on package 1 each 11/15/2022 12/6/2022 SIMEON Donaldson    benzonatate (TESSALON) 100 MG capsule Take 1 capsule (100 mg total) by mouth 3 (three) times daily as needed for Cough. 15 capsule 11/15/2022 11/25/2022 SIMEON Donaldson    promethazine (PHENERGAN) 25 MG suppository Place 1 suppository (25 mg total) rectally every 6 (six) hours as needed for Nausea. Only use if Zofran is not " effective. 10 suppository 11/15/2022 -- SIMEON Donaldson          Follow-up Information       Follow up With Specialties Details Why Contact Info    Regular Provider  In 2 days As needed, or if rash returns              SIMEON Donaldson  11/15/22 0373

## 2023-01-24 ENCOUNTER — HOSPITAL ENCOUNTER (EMERGENCY)
Facility: HOSPITAL | Age: 33
Discharge: HOME | End: 2023-01-24
Payer: COMMERCIAL

## 2023-01-24 VITALS
SYSTOLIC BLOOD PRESSURE: 129 MMHG | OXYGEN SATURATION: 100 % | HEIGHT: 62 IN | HEART RATE: 88 BPM | RESPIRATION RATE: 16 BRPM | TEMPERATURE: 99 F | BODY MASS INDEX: 32.57 KG/M2 | DIASTOLIC BLOOD PRESSURE: 74 MMHG | WEIGHT: 177 LBS

## 2023-01-24 DIAGNOSIS — R10.9 ABDOMINAL PAIN DURING PREGNANCY IN FIRST TRIMESTER: Primary | ICD-10-CM

## 2023-01-24 DIAGNOSIS — O26.891 ABDOMINAL PAIN DURING PREGNANCY IN FIRST TRIMESTER: Primary | ICD-10-CM

## 2023-01-24 LAB
B-HCG UR QL: POSITIVE
BASOPHILS # BLD AUTO: 0.04 K/UL (ref 0–0.2)
BASOPHILS NFR BLD AUTO: 0.4 % (ref 0–1)
BILIRUB UR QL STRIP: NEGATIVE
CLARITY UR: CLEAR
COLOR UR: NORMAL
CTP QC/QA: YES
DIFFERENTIAL METHOD BLD: ABNORMAL
EOSINOPHIL # BLD AUTO: 0.09 K/UL (ref 0–0.5)
EOSINOPHIL NFR BLD AUTO: 0.9 % (ref 1–4)
ERYTHROCYTE [DISTWIDTH] IN BLOOD BY AUTOMATED COUNT: 12.5 % (ref 11.5–14.5)
GLUCOSE UR STRIP-MCNC: NORMAL MG/DL
HCG SERPL-ACNC: 3774 MIU/ML
HCT VFR BLD AUTO: 37.3 % (ref 38–47)
HGB BLD-MCNC: 12.5 G/DL (ref 12–16)
IMM GRANULOCYTES # BLD AUTO: 0.03 K/UL (ref 0–0.04)
IMM GRANULOCYTES NFR BLD: 0.3 % (ref 0–0.4)
INDIRECT COOMBS: NORMAL
KETONES UR STRIP-SCNC: NEGATIVE MG/DL
LEUKOCYTE ESTERASE UR QL STRIP: NEGATIVE
LYMPHOCYTES # BLD AUTO: 2 K/UL (ref 1–4.8)
LYMPHOCYTES NFR BLD AUTO: 20.3 % (ref 27–41)
MCH RBC QN AUTO: 30.5 PG (ref 27–31)
MCHC RBC AUTO-ENTMCNC: 33.5 G/DL (ref 32–36)
MCV RBC AUTO: 91 FL (ref 80–96)
MONOCYTES # BLD AUTO: 0.99 K/UL (ref 0–0.8)
MONOCYTES NFR BLD AUTO: 10.1 % (ref 2–6)
MPC BLD CALC-MCNC: 9.6 FL (ref 9.4–12.4)
NEUTROPHILS # BLD AUTO: 6.69 K/UL (ref 1.8–7.7)
NEUTROPHILS NFR BLD AUTO: 68 % (ref 53–65)
NITRITE UR QL STRIP: NEGATIVE
NRBC # BLD AUTO: 0 X10E3/UL
NRBC, AUTO (.00): 0 %
PH UR STRIP: 6 PH UNITS
PLATELET # BLD AUTO: 389 K/UL (ref 150–400)
PROT UR QL STRIP: NEGATIVE
RBC # BLD AUTO: 4.1 M/UL (ref 4.2–5.4)
RBC # UR STRIP: NEGATIVE /UL
RH BLD: NORMAL
SP GR UR STRIP: 1.02
UROBILINOGEN UR STRIP-ACNC: NORMAL MG/DL
WBC # BLD AUTO: 9.84 K/UL (ref 4.5–11)

## 2023-01-24 PROCEDURE — 99283 EMERGENCY DEPT VISIT LOW MDM: CPT | Mod: 25

## 2023-01-24 PROCEDURE — 99283 EMERGENCY DEPT VISIT LOW MDM: CPT | Mod: ,,, | Performed by: NURSE PRACTITIONER

## 2023-01-24 PROCEDURE — 81003 URINALYSIS AUTO W/O SCOPE: CPT | Performed by: NURSE PRACTITIONER

## 2023-01-24 PROCEDURE — 85025 COMPLETE CBC W/AUTO DIFF WBC: CPT | Performed by: NURSE PRACTITIONER

## 2023-01-24 PROCEDURE — 84702 CHORIONIC GONADOTROPIN TEST: CPT | Performed by: NURSE PRACTITIONER

## 2023-01-24 PROCEDURE — 81025 URINE PREGNANCY TEST: CPT | Performed by: NURSE PRACTITIONER

## 2023-01-24 PROCEDURE — 86900 BLOOD TYPING SEROLOGIC ABO: CPT | Performed by: NURSE PRACTITIONER

## 2023-01-24 PROCEDURE — 99283 PR EMERGENCY DEPT VISIT,LEVEL III: ICD-10-PCS | Mod: ,,, | Performed by: NURSE PRACTITIONER

## 2023-01-24 NOTE — PROVIDER PROGRESS NOTES - EMERGENCY DEPT.
Encounter Date: 1/24/2023    ED Physician Progress Notes        MDM  32 year old female presents to the emergency department to be evaluated for crampy lower abdominal pain that began 4 days ago. Denies any vaginal bleeding. Her appointment is with  at [facility] in 2 weeks to establish care. LMP 12/9/22.  On exam, patient's cervix is closed.  I ordered labs and personally reviewed them.  Diagnosis is abdominal pain in pregnancy.  Patient was discharged in stable condition.  Detailed return precautions discussed.

## 2023-01-24 NOTE — ED PROVIDER NOTES
Encounter Date: 2023       History     Chief Complaint   Patient presents with    Abdominal Cramping     32 year old female presents to the emergency department to be evaluated for crampy lower abdominal pain that began 4 days ago. Denies any vaginal bleeding. Her appointment is with  at [facility] in 2 weeks to establish care. LMP 22.     The history is provided by the patient.   Abdominal Pain  The current episode started several days ago. The abdominal pain is located in the suprapubic region. The other symptoms of the illness do not include fever, fatigue, jaundice, melena, nausea, vomiting, diarrhea, dysuria, hematemesis, hematochezia, vaginal discharge or vaginal bleeding.   Review of patient's allergies indicates:  No Known Allergies  Past Medical History:   Diagnosis Date    Migraine headache     Migraines      Past Surgical History:   Procedure Laterality Date     SECTION      INDUCED       TONSILLECTOMY       History reviewed. No pertinent family history.  Social History     Tobacco Use    Smoking status: Never    Smokeless tobacco: Never   Substance Use Topics    Alcohol use: Not Currently    Drug use: Never     Review of Systems   Constitutional:  Negative for fatigue and fever.   Gastrointestinal:  Positive for abdominal pain. Negative for diarrhea, hematemesis, hematochezia, jaundice, melena, nausea and vomiting.   Genitourinary:  Negative for dysuria, vaginal bleeding and vaginal discharge.   All other systems reviewed and are negative.    Physical Exam     Initial Vitals [23 1403]   BP Pulse Resp Temp SpO2   129/74 88 16 98.6 °F (37 °C) 100 %      MAP       --         Physical Exam    Vitals reviewed.  Constitutional: She appears well-developed and well-nourished.   Neck: Neck supple.   Cardiovascular:  Normal rate and regular rhythm.           Pulmonary/Chest: Breath sounds normal.   Abdominal: Abdomen is soft. Bowel sounds are normal. She exhibits no  distension and no mass. There is no abdominal tenderness. There is no rebound and no guarding.   Genitourinary: Cervix exhibits no discharge.    Genitourinary Comments: Chaperone: Silvia BishnuALPA. Cervix is closed     Musculoskeletal:         General: Normal range of motion.      Cervical back: Neck supple.     Neurological: She is alert and oriented to person, place, and time. She has normal strength. GCS score is 15. GCS eye subscore is 4. GCS verbal subscore is 5. GCS motor subscore is 6.   Skin: Skin is warm and dry. Capillary refill takes less than 2 seconds.   Psychiatric: She has a normal mood and affect.       Medical Screening Exam   See Full Note    ED Course   Procedures  Labs Reviewed   CBC WITH DIFFERENTIAL - Abnormal; Notable for the following components:       Result Value    RBC 4.10 (*)     Hematocrit 37.3 (*)     Neutrophils % 68.0 (*)     Lymphocytes % 20.3 (*)     Monocytes % 10.1 (*)     Eosinophils % 0.9 (*)     Monocytes, Absolute 0.99 (*)     All other components within normal limits   POCT URINE PREGNANCY - Abnormal; Notable for the following components:    POC Preg Test, Ur Positive (*)     All other components within normal limits   URINALYSIS   CBC W/ AUTO DIFFERENTIAL    Narrative:     The following orders were created for panel order CBC auto differential.  Procedure                               Abnormality         Status                     ---------                               -----------         ------                     CBC with Differential[[id number]]        Abnormal            Final result                 Please view results for these tests on the individual orders.   HCG, TOTAL, QUANTITATIVE   TYPE & SCREEN          Imaging Results    None          Medications - No data to display              ED Course as of 01/24/23 1553   Tue Jan 24, 2023   1537 HCG Quant: 3,774 []   1537 HCG Quant: 3,774 []      ED Course User Index  [LUCHO] MARILU Jurado          Clinical  Impression:   Final diagnoses:  [O26.891, R10.9] Abdominal pain during pregnancy in first trimester (Primary)        ED Disposition Condition    Discharge Stable          ED Prescriptions    None       Follow-up Information    None          MARILU Jurado  01/24/23 1530       MARILU Jurado  01/24/23 1551

## 2023-01-24 NOTE — ED TRIAGE NOTES
Presents to ED for complaints of cramping and leg pain since Friday.  Patient is 6 weeks pregnant and has a history of 3 miscarriages.

## 2023-01-24 NOTE — DISCHARGE INSTRUCTIONS
Pelvic rest.  Drink plenty of fluids.  Follow-up with your OB in 2 days.  Drink plenty of fluids.Follow up with your primary care provider in 2 days. Return to the emergency department for any increase in symptoms or for any other new or worrisome symptoms.

## 2023-01-25 ENCOUNTER — TELEPHONE (OUTPATIENT)
Dept: EMERGENCY MEDICINE | Facility: HOSPITAL | Age: 33
End: 2023-01-25
Payer: COMMERCIAL

## 2023-01-26 ENCOUNTER — OFFICE VISIT (OUTPATIENT)
Dept: FAMILY MEDICINE | Facility: CLINIC | Age: 33
End: 2023-01-26
Payer: COMMERCIAL

## 2023-01-26 VITALS
RESPIRATION RATE: 18 BRPM | WEIGHT: 175 LBS | BODY MASS INDEX: 32.2 KG/M2 | HEIGHT: 62 IN | SYSTOLIC BLOOD PRESSURE: 122 MMHG | HEART RATE: 93 BPM | TEMPERATURE: 98 F | OXYGEN SATURATION: 99 % | DIASTOLIC BLOOD PRESSURE: 70 MMHG

## 2023-01-26 DIAGNOSIS — Z36.9 1ST TRIMESTER SCREENING: Primary | ICD-10-CM

## 2023-01-26 LAB — HCG SERPL-ACNC: 4220 MIU/ML

## 2023-01-26 PROCEDURE — 1160F PR REVIEW ALL MEDS BY PRESCRIBER/CLIN PHARMACIST DOCUMENTED: ICD-10-PCS | Mod: ,,, | Performed by: NURSE PRACTITIONER

## 2023-01-26 PROCEDURE — 84702 CHORIONIC GONADOTROPIN TEST: CPT | Mod: ,,, | Performed by: CLINICAL MEDICAL LABORATORY

## 2023-01-26 PROCEDURE — 3008F BODY MASS INDEX DOCD: CPT | Mod: ,,, | Performed by: NURSE PRACTITIONER

## 2023-01-26 PROCEDURE — 84702 HCG, TOTAL, QUANTITATIVE: ICD-10-PCS | Mod: ,,, | Performed by: CLINICAL MEDICAL LABORATORY

## 2023-01-26 PROCEDURE — 99212 PR OFFICE/OUTPT VISIT, EST, LEVL II, 10-19 MIN: ICD-10-PCS | Mod: ,,, | Performed by: NURSE PRACTITIONER

## 2023-01-26 PROCEDURE — 3074F SYST BP LT 130 MM HG: CPT | Mod: ,,, | Performed by: NURSE PRACTITIONER

## 2023-01-26 PROCEDURE — 99212 OFFICE O/P EST SF 10 MIN: CPT | Mod: ,,, | Performed by: NURSE PRACTITIONER

## 2023-01-26 PROCEDURE — 1159F MED LIST DOCD IN RCRD: CPT | Mod: ,,, | Performed by: NURSE PRACTITIONER

## 2023-01-26 PROCEDURE — 3074F PR MOST RECENT SYSTOLIC BLOOD PRESSURE < 130 MM HG: ICD-10-PCS | Mod: ,,, | Performed by: NURSE PRACTITIONER

## 2023-01-26 PROCEDURE — 1159F PR MEDICATION LIST DOCUMENTED IN MEDICAL RECORD: ICD-10-PCS | Mod: ,,, | Performed by: NURSE PRACTITIONER

## 2023-01-26 PROCEDURE — 1160F RVW MEDS BY RX/DR IN RCRD: CPT | Mod: ,,, | Performed by: NURSE PRACTITIONER

## 2023-01-26 PROCEDURE — 3078F DIAST BP <80 MM HG: CPT | Mod: ,,, | Performed by: NURSE PRACTITIONER

## 2023-01-26 PROCEDURE — 3008F PR BODY MASS INDEX (BMI) DOCUMENTED: ICD-10-PCS | Mod: ,,, | Performed by: NURSE PRACTITIONER

## 2023-01-26 PROCEDURE — 3078F PR MOST RECENT DIASTOLIC BLOOD PRESSURE < 80 MM HG: ICD-10-PCS | Mod: ,,, | Performed by: NURSE PRACTITIONER

## 2023-01-26 NOTE — PROGRESS NOTES
"New clinic note    Britney Jarrett is a 32 y.o. female     Chief Complaint:   Chief Complaint   Patient presents with    ER Follow Up     Patient was seen in [facility] on 1/24/2023 r/t abdominal pain in 1st trimester of pregnancy, cramping has gotten better, no spotting noted. Patient scheduled to see Dr Santamaria at [facility] 2/1. Will check levels today because of her history of miscarriages        Subjective:    Patient here for ED follow up. Patient reports she went to [facility] 2 days ago due to abdominal cramping. Patient reports she is 5 weeks pregnant. Patient has a hx of 3 miscarriages and PCOS. Patient reports abdominal pain has resolved. Denies vaginal bleeding. Reports she had vaginal bleeding with all previous miscarriages. Denies dysuria. Patient states she has an appointment with ob-gyn next week. Patient here for hcg level to be redrawn.        Allergies:   Review of patient's allergies indicates:  No Known Allergies     Past Medical History:  Past Medical History:   Diagnosis Date    Migraine headache     Migraines         Current Medications:    Current Outpatient Medications:     PNV no.153/FA/om3/dha/epa/fish (PRENATAL GUMMIES ORAL), Take by mouth., Disp: , Rfl:        Review of Systems   Constitutional:  Negative for fever.   Respiratory:  Negative for cough and shortness of breath.    Gastrointestinal:  Negative for abdominal pain.   Genitourinary:  Negative for dysuria and flank pain.        Objective:    /70 (BP Location: Left arm, Patient Position: Sitting)   Pulse 93   Temp 98.4 °F (36.9 °C) (Oral)   Resp 18   Ht 5' 2" (1.575 m)   Wt 79.4 kg (175 lb)   LMP 12/08/2022 (Exact Date) Comment: only lasted 1 day  SpO2 99%   BMI 32.01 kg/m²      Physical Exam  Constitutional:       Appearance: Normal appearance.   Eyes:      Extraocular Movements: Extraocular movements intact.   Cardiovascular:      Rate and Rhythm: Normal rate and regular rhythm.      Pulses: Normal pulses.      Heart " sounds: Normal heart sounds.   Pulmonary:      Effort: Pulmonary effort is normal.      Breath sounds: Normal breath sounds.   Abdominal:      Palpations: Abdomen is soft.      Tenderness: There is no abdominal tenderness. There is no guarding or rebound.   Neurological:      Mental Status: She is alert and oriented to person, place, and time.        Assessment and Plan:    1. 1St trimester screening         1St trimester screening  -     hCG, Total, Quantitative; Future; Expected date: 01/26/2023     -repeat hcg level today  -continue prenatal vitamins  -f/u with ob-gyn as scheduled      There are no Patient Instructions on file for this visit.   Follow up if symptoms worsen or fail to improve.

## 2023-02-21 ENCOUNTER — OFFICE VISIT (OUTPATIENT)
Dept: FAMILY MEDICINE | Facility: CLINIC | Age: 33
End: 2023-02-21
Payer: COMMERCIAL

## 2023-02-21 VITALS
HEIGHT: 62 IN | WEIGHT: 180 LBS | OXYGEN SATURATION: 99 % | RESPIRATION RATE: 18 BRPM | HEART RATE: 88 BPM | BODY MASS INDEX: 33.13 KG/M2 | TEMPERATURE: 98 F | DIASTOLIC BLOOD PRESSURE: 78 MMHG | SYSTOLIC BLOOD PRESSURE: 120 MMHG

## 2023-02-21 DIAGNOSIS — J02.9 PHARYNGITIS, UNSPECIFIED ETIOLOGY: ICD-10-CM

## 2023-02-21 DIAGNOSIS — J01.00 ACUTE NON-RECURRENT MAXILLARY SINUSITIS: Primary | ICD-10-CM

## 2023-02-21 DIAGNOSIS — J02.9 SORE THROAT: ICD-10-CM

## 2023-02-21 PROBLEM — M25.531 PAIN OF BOTH WRIST JOINTS: Status: RESOLVED | Noted: 2022-02-15 | Resolved: 2023-02-21

## 2023-02-21 PROBLEM — R21 RASH: Status: RESOLVED | Noted: 2022-11-15 | Resolved: 2023-02-21

## 2023-02-21 PROBLEM — M25.532 PAIN OF BOTH WRIST JOINTS: Status: RESOLVED | Noted: 2022-02-15 | Resolved: 2023-02-21

## 2023-02-21 PROBLEM — R11.2 NAUSEA AND VOMITING: Status: RESOLVED | Noted: 2022-11-15 | Resolved: 2023-02-21

## 2023-02-21 LAB
CTP QC/QA: YES
FLUAV AG NPH QL: NEGATIVE
FLUBV AG NPH QL: NEGATIVE
S PYO RRNA THROAT QL PROBE: NEGATIVE
SARS-COV-2 AG RESP QL IA.RAPID: NEGATIVE

## 2023-02-21 PROCEDURE — 96372 PR INJECTION,THERAP/PROPH/DIAG2ST, IM OR SUBCUT: ICD-10-PCS | Mod: ,,, | Performed by: NURSE PRACTITIONER

## 2023-02-21 PROCEDURE — 3008F BODY MASS INDEX DOCD: CPT | Mod: ,,, | Performed by: NURSE PRACTITIONER

## 2023-02-21 PROCEDURE — 99214 PR OFFICE/OUTPT VISIT, EST, LEVL IV, 30-39 MIN: ICD-10-PCS | Mod: 25,,, | Performed by: NURSE PRACTITIONER

## 2023-02-21 PROCEDURE — 3074F PR MOST RECENT SYSTOLIC BLOOD PRESSURE < 130 MM HG: ICD-10-PCS | Mod: ,,, | Performed by: NURSE PRACTITIONER

## 2023-02-21 PROCEDURE — 3078F DIAST BP <80 MM HG: CPT | Mod: ,,, | Performed by: NURSE PRACTITIONER

## 2023-02-21 PROCEDURE — 87804 POCT INFLUENZA A/B: ICD-10-PCS | Mod: QW,,, | Performed by: NURSE PRACTITIONER

## 2023-02-21 PROCEDURE — 87880 STREP A ASSAY W/OPTIC: CPT | Mod: QW,,, | Performed by: NURSE PRACTITIONER

## 2023-02-21 PROCEDURE — 99214 OFFICE O/P EST MOD 30 MIN: CPT | Mod: 25,,, | Performed by: NURSE PRACTITIONER

## 2023-02-21 PROCEDURE — 1159F MED LIST DOCD IN RCRD: CPT | Mod: ,,, | Performed by: NURSE PRACTITIONER

## 2023-02-21 PROCEDURE — 3008F PR BODY MASS INDEX (BMI) DOCUMENTED: ICD-10-PCS | Mod: ,,, | Performed by: NURSE PRACTITIONER

## 2023-02-21 PROCEDURE — 87426 SARS CORONAVIRUS 2 ANTIGEN POCT: ICD-10-PCS | Mod: QW,,, | Performed by: NURSE PRACTITIONER

## 2023-02-21 PROCEDURE — 3078F PR MOST RECENT DIASTOLIC BLOOD PRESSURE < 80 MM HG: ICD-10-PCS | Mod: ,,, | Performed by: NURSE PRACTITIONER

## 2023-02-21 PROCEDURE — 1159F PR MEDICATION LIST DOCUMENTED IN MEDICAL RECORD: ICD-10-PCS | Mod: ,,, | Performed by: NURSE PRACTITIONER

## 2023-02-21 PROCEDURE — 87426 SARSCOV CORONAVIRUS AG IA: CPT | Mod: QW,,, | Performed by: NURSE PRACTITIONER

## 2023-02-21 PROCEDURE — 87804 INFLUENZA ASSAY W/OPTIC: CPT | Mod: QW,,, | Performed by: NURSE PRACTITIONER

## 2023-02-21 PROCEDURE — 87880 POCT RAPID STREP A: ICD-10-PCS | Mod: QW,,, | Performed by: NURSE PRACTITIONER

## 2023-02-21 PROCEDURE — 3074F SYST BP LT 130 MM HG: CPT | Mod: ,,, | Performed by: NURSE PRACTITIONER

## 2023-02-21 PROCEDURE — 96372 THER/PROPH/DIAG INJ SC/IM: CPT | Mod: ,,, | Performed by: NURSE PRACTITIONER

## 2023-02-21 RX ORDER — AZITHROMYCIN 250 MG/1
TABLET, FILM COATED ORAL
Qty: 6 TABLET | Refills: 0 | Status: SHIPPED | OUTPATIENT
Start: 2023-02-21 | End: 2023-02-26

## 2023-02-21 RX ORDER — TRIAMCINOLONE ACETONIDE 55 UG/1
2 SPRAY, METERED NASAL DAILY
Qty: 16.9 ML | Refills: 0 | Status: SHIPPED | OUTPATIENT
Start: 2023-02-21

## 2023-02-21 RX ORDER — PROMETHAZINE HYDROCHLORIDE 12.5 MG/1
12.5 TABLET ORAL
COMMUNITY
Start: 2023-02-01

## 2023-02-21 RX ORDER — PSEUDOEPHEDRINE HCL 120 MG/1
TABLET, FILM COATED, EXTENDED RELEASE ORAL
Qty: 28 TABLET | Refills: 0 | Status: SHIPPED | OUTPATIENT
Start: 2023-02-21

## 2023-02-21 RX ORDER — CEFTRIAXONE 1 G/1
1 INJECTION, POWDER, FOR SOLUTION INTRAMUSCULAR; INTRAVENOUS
Status: COMPLETED | OUTPATIENT
Start: 2023-02-21 | End: 2023-02-21

## 2023-02-21 RX ORDER — PROGESTERONE 200 MG/1
200 CAPSULE ORAL 2 TIMES DAILY
COMMUNITY
Start: 2023-02-20

## 2023-02-21 RX ADMIN — CEFTRIAXONE 1 G: 1 INJECTION, POWDER, FOR SOLUTION INTRAMUSCULAR; INTRAVENOUS at 02:02

## 2023-02-21 NOTE — LETTER
February 21, 2023      [facility]  [address]  [place] [address]  Phone: [phone]  Fax: [phone]       Patient: Britney Jarrett   YOB: 1990  Date of Visit: 02/21/2023    To Whom It May Concern:    Dk Jarrett  was at [facility] on 02/21/2023. The patient may return to work/school on 02/23/23 with no restrictions. If you have any questions or concerns, or if I can be of further assistance, please do not hesitate to contact me.    Sincerely,    MARILU Ladd RN

## 2023-02-21 NOTE — PROGRESS NOTES
Keya Landin NP   [facility]  [address] [address] [place]     PATIENT NAME: Britney Jarrett  : 1990  DATE: 23  MRN: [id number]      Billing Provider: Keya Landin NP  Level of Service: VT OFFICE/OUTPT VISIT, EST, LEVL IV, 30-39 MIN  Patient PCP Information       Provider PCP Type    Keya Landin NP General            Reason for Visit / Chief Complaint: Sore Throat ((9 weeks gestation)), Otalgia (Bilateral ear pain- started yesterday morning), and Fatigue       Update PCP  Update Chief Complaint         History of Present Illness / Problem Focused Workflow     Britney Jarrett presents to the clinic pt c/o sore throat and otalgia, both ears  and fatigue, symptoms started yesterday, pt is 9 weeks pregnant      Review of Systems     Review of Systems   Constitutional:  Positive for fatigue. Negative for chills and fever.   HENT:  Positive for ear pain and sore throat. Negative for nasal congestion, facial swelling, hearing loss, mouth dryness, mouth sores, postnasal drip, rhinorrhea, sinus pressure/congestion and goiter.    Eyes:  Negative for discharge and itching.   Respiratory:  Negative for cough, shortness of breath and wheezing.    Cardiovascular:  Negative for chest pain and leg swelling.   Gastrointestinal:  Negative for abdominal pain, change in bowel habit and change in bowel habit.   Genitourinary:  Negative for difficulty urinating, dysuria, enuresis, frequency, hematuria and urgency.   Neurological:  Negative for dizziness, vertigo, syncope, weakness and headaches.   Psychiatric/Behavioral:  Negative for decreased concentration.    All other systems reviewed and are negative.     Medical / Social / Family History     Past Medical History:   Diagnosis Date    Migraine headache     Migraines        Past Surgical History:   Procedure Laterality Date     SECTION      INDUCED       TONSILLECTOMY         Social History  Ms.  reports that she has never smoked. She has never  "been exposed to tobacco smoke. She has never used smokeless tobacco. She reports that she does not currently use alcohol. She reports that she does not use drugs.    Family History  Ms.'s family history is not on file.    Medications and Allergies     Medications  Outpatient Medications Marked as Taking for the 2/21/23 encounter (Office Visit) with Keya Landin NP   Medication Sig Dispense Refill    PNV no.153/FA/om3/dha/epa/fish (PRENATAL GUMMIES ORAL) Take by mouth.      progesterone (PROMETRIUM) 200 MG capsule Place 200 mg vaginally 2 (two) times daily.      promethazine (PHENERGAN) 12.5 MG Tab Take 12.5 mg by mouth.       Current Facility-Administered Medications for the 2/21/23 encounter (Office Visit) with Keya Landin NP   Medication Dose Route Frequency Provider Last Rate Last Admin    [COMPLETED] cefTRIAXone injection 1 g  1 g Intramuscular 1 time in Clinic/HOD Keya Landin NP   1 g at 02/21/23 1419       Allergies  Review of patient's allergies indicates:  No Known Allergies    Physical Examination     Vitals:    02/21/23 1335   BP: 120/78   BP Location: Right arm   Patient Position: Sitting   Pulse: 88   Resp: 18   Temp: 98.3 °F (36.8 °C)   TempSrc: Oral   SpO2: 99%   Weight: 81.6 kg (180 lb)   Height: 5' 2" (1.575 m)      Physical Exam  Constitutional:       Appearance: Normal appearance.   HENT:      Head: Normocephalic.      Right Ear: Tympanic membrane, ear canal and external ear normal.      Left Ear: Tympanic membrane, ear canal and external ear normal.      Nose: Congestion present.      Comments: Mod amt thick yellow nasal secretion, pressure over max region     Mouth/Throat:      Mouth: Mucous membranes are moist.   Eyes:      Extraocular Movements: Extraocular movements intact.      Conjunctiva/sclera: Conjunctivae normal.      Pupils: Pupils are equal, round, and reactive to light.   Neck:      Comments: Tu ant cervical nodes  Cardiovascular:      Rate and Rhythm: Normal rate and " regular rhythm.      Pulses: Normal pulses.      Heart sounds: Normal heart sounds.   Pulmonary:      Effort: Pulmonary effort is normal.      Breath sounds: Normal breath sounds.   Musculoskeletal:         General: Normal range of motion.      Cervical back: Normal range of motion.   Lymphadenopathy:      Cervical: Cervical adenopathy present.   Skin:     General: Skin is warm and dry.   Neurological:      General: No focal deficit present.      Mental Status: She is alert and oriented to person, place, and time.   Psychiatric:         Behavior: Behavior normal.        Assessment and Plan (including Health Maintenance)      Problem List  Smart Sets  Document Outside HM   :    Plan:     Acute non-recurrent maxillary sinusitis  -     cefTRIAXone injection 1 g  -     azithromycin (Z-CHRISTIANO) 250 MG tablet; Take 2 tablets by mouth on day 1; Take 1 tablet by mouth on days 2-5  Dispense: 6 tablet; Refill: 0  -     pseudoephedrine (SUDAFED) 120 mg 12 hr tablet; 1 table po bid prn  Dispense: 28 tablet; Refill: 0  -     triamcinolone (NASACORT) 55 mcg nasal inhaler; 2 sprays by Nasal route once daily.  Dispense: 16.9 mL; Refill: 0    Sore throat    Pharyngitis, unspecified etiology  -     POCT rapid strep A  -     SARS Coronavirus 2 Antigen, POCT  -     POCT Influenza A/B Rapid Antigen  -     cefTRIAXone injection 1 g  -     azithromycin (Z-CHRISTIANO) 250 MG tablet; Take 2 tablets by mouth on day 1; Take 1 tablet by mouth on days 2-5  Dispense: 6 tablet; Refill: 0  -     triamcinolone (NASACORT) 55 mcg nasal inhaler; 2 sprays by Nasal route once daily.  Dispense: 16.9 mL; Refill: 0            Health Maintenance Due   Topic Date Due    Hepatitis C Screening  Never done    Cervical Cancer Screening  Never done    Lipid Panel  Never done    COVID-19 Vaccine (1) Never done    HIV Screening  Never done    TETANUS VACCINE  Never done       Problem List Items Addressed This Visit          ENT    Pharyngitis    Current Assessment & Plan      meds as ordered, gargle with warm salty water, return to clinic as needed         Relevant Medications    cefTRIAXone injection 1 g (Completed)    azithromycin (Z-CHRISTIANO) 250 MG tablet    triamcinolone (NASACORT) 55 mcg nasal inhaler    Other Relevant Orders    POCT rapid strep A (Completed)    SARS Coronavirus 2 Antigen, POCT (Completed)    POCT Influenza A/B Rapid Antigen (Completed)    Acute non-recurrent maxillary sinusitis - Primary    Current Assessment & Plan     Avoid irritants, meds as ordered, return to clinic as needed         Relevant Medications    cefTRIAXone injection 1 g (Completed)    azithromycin (Z-CHRISTIANO) 250 MG tablet    pseudoephedrine (SUDAFED) 120 mg 12 hr tablet    triamcinolone (NASACORT) 55 mcg nasal inhaler     Other Visit Diagnoses       Sore throat                  The patient has no Health Maintenance topics of status Not Due    Procedures     Future Appointments   Date Time Provider Department Center   3/2/2023  8:00 AM MARILU [facility]        Follow up if symptoms worsen or fail to improve.       Signature:  eKya Landin NP    Date of encounter: 2/21/23

## 2023-05-29 PROBLEM — J01.00 ACUTE NON-RECURRENT MAXILLARY SINUSITIS: Status: RESOLVED | Noted: 2023-02-21 | Resolved: 2023-05-29

## 2023-07-24 ENCOUNTER — PATIENT MESSAGE (OUTPATIENT)
Dept: ADMINISTRATIVE | Facility: HOSPITAL | Age: 33
End: 2023-07-24

## 2024-01-29 ENCOUNTER — PATIENT OUTREACH (OUTPATIENT)
Dept: ADMINISTRATIVE | Facility: HOSPITAL | Age: 34
End: 2024-01-29

## 2024-01-29 NOTE — PROGRESS NOTES
Population Health Chart Review & Patient Outreach Details  Per [facility] website, insurance is active and pt is listed on the attributed list needing a healthy you performed in   Hy scheduled for 24    Further Action Needed If Patient Returns Outreach:            Updates Requested / Reviewed:     []  Care Everywhere    []     []  External Sources ([facility], [facility], DIS, etc.)    [] [facility]   [] [facility]   [] Other:    []  Care Team Updated   []  Removed  or Duplicate Orders   []  Immunization Reconciliation Completed / Queried    [] [place]   [] [place]   [] [place]   [] [place]      Health Maintenance Topics Addressed and Outreach Outcomes / Actions Taken:             Breast Cancer Screening []  Mammogram Order Placed    []  Mammogram Screening Scheduled    []  External Records Requested & Care Team Updated if Applicable    []  External Records Uploaded & Care Team Updated if Applicable    []  Pt Declined Scheduling Mammogram    []  Pt Will Schedule with External Provider / Order Routed & Care Team Updated if Applicable              Cervical Cancer Screening []  Pap Smear Scheduled in Primary Care or OBGYN    []  External Records Requested & Care Team Updated if Applicable       []  External Records Uploaded, Care Team Updated, & History Updated if Applicable    []  Patient Declined Scheduling Pap Smear    []  Patient Will Schedule with External Provider & Care Team Updated if Applicable                  Colorectal Cancer Screening []  Colonoscopy Case Request / Referral / Home Test Order Placed    []  External Records Requested & Care Team Updated if Applicable    []  External Records Uploaded, Care Team Updated, & History Updated if Applicable    []  Patient Declined Completing Colon Cancer Screening    []  Patient Will Schedule with External Provider & Care Team Updated if Applicable    []  Fit Kit Mailed (add the SmartPhrase under additional notes)    []  Reminded Patient to Complete Home  Test                Diabetic Eye Exam []  Eye Exam Screening Order Placed    []  Eye Camera Scheduled or Optometry/Ophthalmology Referral Placed    []  External Records Requested & Care Team Updated if Applicable    []  External Records Uploaded, Care Team Updated, & History Updated if Applicable    []  Patient Declined Scheduling Eye Exam    []  Patient Will Schedule with External Provider & Care Team Updated if Applicable             Blood Pressure Control []  Primary Care Follow Up Visit Scheduled     []  Remote Blood Pressure Reading Captured    []  Patient Declined Remote Reading or Scheduling Appt - Escalated to PCP    []  Patient Will Call Back or Send Portal Message with Reading                 HbA1c & Other Labs []  Overdue Lab(s) Ordered    []  Overdue Lab(s) Scheduled    []  External Records Uploaded & Care Team Updated if Applicable    []  Primary Care Follow Up Visit Scheduled     []  Reminded Patient to Complete A1c Home Test    []  Patient Declined Scheduling Labs or Will Call Back to Schedule    []  Patient Will Schedule with External Provider / Order Routed, & Care Team Updated if Applicable           Primary Care Appointment []  Primary Care Appt Scheduled    []  Patient Declined Scheduling or Will Call Back to Schedule    []  Pt Established with External Provider, Updated Care Team, & Informed Pt to Notify Payor if Applicable           Medication Adherence /    Statin Use []  Primary Care Appointment Scheduled    []  Patient Reminded to  Prescription    []  Patient Declined, Provider Notified if Needed    []  Sent Provider Message to Review to Evaluate Pt for Statin, Add Exclusion Dx Codes, Document   Exclusion in Problem List, Change Statin Intensity Level to Moderate or High Intensity if Applicable                Osteoporosis Screening []  Dexa Order Placed    []  Dexa Appointment Scheduled    []  External Records Requested & Care Team Updated    []  External Records Uploaded, Care Team  Updated, & History Updated if Applicable    []  Patient Declined Scheduling Dexa or Will Call Back to Schedule    []  Patient Will Schedule with External Provider / Order Routed & Care Team Updated if Applicable       Additional Notes:

## 2024-10-25 ENCOUNTER — HOSPITAL ENCOUNTER (EMERGENCY)
Facility: HOSPITAL | Age: 34
Discharge: HOME | End: 2024-10-25

## 2024-10-25 VITALS
WEIGHT: 170 LBS | OXYGEN SATURATION: 99 % | RESPIRATION RATE: 18 BRPM | SYSTOLIC BLOOD PRESSURE: 123 MMHG | DIASTOLIC BLOOD PRESSURE: 86 MMHG | HEART RATE: 98 BPM | BODY MASS INDEX: 31.28 KG/M2 | HEIGHT: 62 IN | TEMPERATURE: 100 F

## 2024-10-25 DIAGNOSIS — G43.009 MIGRAINE WITHOUT AURA AND WITHOUT STATUS MIGRAINOSUS, NOT INTRACTABLE: Primary | ICD-10-CM

## 2024-10-25 LAB
GROUP A STREP MOLECULAR (OHS): NEGATIVE
INFLUENZA A MOLECULAR (OHS): NEGATIVE
INFLUENZA B MOLECULAR (OHS): NEGATIVE
SARS-COV-2 RDRP RESP QL NAA+PROBE: NEGATIVE

## 2024-10-25 PROCEDURE — 87635 SARS-COV-2 COVID-19 AMP PRB: CPT | Performed by: NURSE PRACTITIONER

## 2024-10-25 PROCEDURE — 87502 INFLUENZA DNA AMP PROBE: CPT | Performed by: NURSE PRACTITIONER

## 2024-10-25 PROCEDURE — 63600175 PHARM REV CODE 636 W HCPCS: Performed by: NURSE PRACTITIONER

## 2024-10-25 PROCEDURE — 87651 STREP A DNA AMP PROBE: CPT | Performed by: NURSE PRACTITIONER

## 2024-10-25 PROCEDURE — 96372 THER/PROPH/DIAG INJ SC/IM: CPT | Performed by: NURSE PRACTITIONER

## 2024-10-25 PROCEDURE — 99284 EMERGENCY DEPT VISIT MOD MDM: CPT | Mod: ,,, | Performed by: NURSE PRACTITIONER

## 2024-10-25 PROCEDURE — 99284 EMERGENCY DEPT VISIT MOD MDM: CPT | Mod: 25

## 2024-10-25 RX ORDER — KETOROLAC TROMETHAMINE 30 MG/ML
60 INJECTION, SOLUTION INTRAMUSCULAR; INTRAVENOUS
Status: COMPLETED | OUTPATIENT
Start: 2024-10-25 | End: 2024-10-25

## 2024-10-25 RX ORDER — ONDANSETRON HYDROCHLORIDE 2 MG/ML
4 INJECTION, SOLUTION INTRAVENOUS
Status: COMPLETED | OUTPATIENT
Start: 2024-10-25 | End: 2024-10-25

## 2024-10-25 RX ORDER — ONDANSETRON 4 MG/1
4 TABLET, ORALLY DISINTEGRATING ORAL EVERY 6 HOURS PRN
Qty: 20 TABLET | Refills: 0 | Status: SHIPPED | OUTPATIENT
Start: 2024-10-25

## 2024-10-25 RX ADMIN — ONDANSETRON 4 MG: 2 INJECTION INTRAMUSCULAR; INTRAVENOUS at 07:10

## 2024-10-25 RX ADMIN — KETOROLAC TROMETHAMINE 60 MG: 30 INJECTION, SOLUTION INTRAMUSCULAR at 07:10

## 2024-10-26 ENCOUNTER — HOSPITAL ENCOUNTER (EMERGENCY)
Facility: HOSPITAL | Age: 34
Discharge: HOME | End: 2024-10-26

## 2024-10-26 VITALS
DIASTOLIC BLOOD PRESSURE: 69 MMHG | HEART RATE: 98 BPM | TEMPERATURE: 99 F | SYSTOLIC BLOOD PRESSURE: 116 MMHG | OXYGEN SATURATION: 99 % | RESPIRATION RATE: 18 BRPM | BODY MASS INDEX: 31.28 KG/M2 | HEIGHT: 62 IN | WEIGHT: 170 LBS

## 2024-10-26 DIAGNOSIS — G43.909 MIGRAINE WITHOUT STATUS MIGRAINOSUS, NOT INTRACTABLE, UNSPECIFIED MIGRAINE TYPE: ICD-10-CM

## 2024-10-26 DIAGNOSIS — R51.9 NONINTRACTABLE EPISODIC HEADACHE, UNSPECIFIED HEADACHE TYPE: Primary | ICD-10-CM

## 2024-10-26 PROCEDURE — 96372 THER/PROPH/DIAG INJ SC/IM: CPT | Performed by: NURSE PRACTITIONER

## 2024-10-26 PROCEDURE — 99284 EMERGENCY DEPT VISIT MOD MDM: CPT | Mod: ,,, | Performed by: NURSE PRACTITIONER

## 2024-10-26 PROCEDURE — 99284 EMERGENCY DEPT VISIT MOD MDM: CPT | Mod: 25

## 2024-10-26 PROCEDURE — 63600175 PHARM REV CODE 636 W HCPCS: Performed by: NURSE PRACTITIONER

## 2024-10-26 RX ORDER — PROMETHAZINE HYDROCHLORIDE 25 MG/ML
25 INJECTION, SOLUTION INTRAMUSCULAR; INTRAVENOUS
Status: COMPLETED | OUTPATIENT
Start: 2024-10-26 | End: 2024-10-26

## 2024-10-26 RX ORDER — METHYLPREDNISOLONE ACETATE 40 MG/ML
40 INJECTION, SUSPENSION INTRA-ARTICULAR; INTRALESIONAL; INTRAMUSCULAR; SOFT TISSUE
Status: COMPLETED | OUTPATIENT
Start: 2024-10-26 | End: 2024-10-26

## 2024-10-26 RX ORDER — DEXAMETHASONE SODIUM PHOSPHATE 4 MG/ML
8 INJECTION, SOLUTION INTRA-ARTICULAR; INTRALESIONAL; INTRAMUSCULAR; INTRAVENOUS; SOFT TISSUE
Status: COMPLETED | OUTPATIENT
Start: 2024-10-26 | End: 2024-10-26

## 2024-10-26 RX ORDER — ONDANSETRON HYDROCHLORIDE 2 MG/ML
4 INJECTION, SOLUTION INTRAVENOUS
Status: DISCONTINUED | OUTPATIENT
Start: 2024-10-26 | End: 2024-10-26

## 2024-10-26 RX ORDER — KETOROLAC TROMETHAMINE 30 MG/ML
60 INJECTION, SOLUTION INTRAMUSCULAR; INTRAVENOUS
Status: COMPLETED | OUTPATIENT
Start: 2024-10-26 | End: 2024-10-26

## 2024-10-26 RX ADMIN — DEXAMETHASONE SODIUM PHOSPHATE 8 MG: 4 INJECTION, SOLUTION INTRA-ARTICULAR; INTRALESIONAL; INTRAMUSCULAR; INTRAVENOUS; SOFT TISSUE at 04:10

## 2024-10-26 RX ADMIN — KETOROLAC TROMETHAMINE 60 MG: 30 INJECTION, SOLUTION INTRAMUSCULAR at 04:10

## 2024-10-26 RX ADMIN — METHYLPREDNISOLONE ACETATE 40 MG: 40 INJECTION, SUSPENSION INTRA-ARTICULAR; INTRALESIONAL; INTRAMUSCULAR; SOFT TISSUE at 04:10

## 2024-10-26 RX ADMIN — PROMETHAZINE HYDROCHLORIDE 25 MG: 25 INJECTION INTRAMUSCULAR; INTRAVENOUS at 04:10

## 2024-10-28 ENCOUNTER — TELEPHONE (OUTPATIENT)
Dept: EMERGENCY MEDICINE | Facility: HOSPITAL | Age: 34
End: 2024-10-28

## 2024-12-03 DIAGNOSIS — N91.1 SECONDARY AMENORRHEA: Primary | ICD-10-CM

## 2024-12-18 ENCOUNTER — OFFICE VISIT (OUTPATIENT)
Dept: OBSTETRICS AND GYNECOLOGY | Facility: CLINIC | Age: 34
End: 2024-12-18
Payer: COMMERCIAL

## 2024-12-18 ENCOUNTER — HOSPITAL ENCOUNTER (OUTPATIENT)
Dept: RADIOLOGY | Facility: HOSPITAL | Age: 34
Discharge: HOME | End: 2024-12-18
Attending: ADVANCED PRACTICE MIDWIFE
Payer: COMMERCIAL

## 2024-12-18 VITALS
HEIGHT: 62 IN | DIASTOLIC BLOOD PRESSURE: 88 MMHG | OXYGEN SATURATION: 100 % | HEART RATE: 101 BPM | WEIGHT: 175 LBS | SYSTOLIC BLOOD PRESSURE: 130 MMHG | RESPIRATION RATE: 19 BRPM | BODY MASS INDEX: 32.2 KG/M2

## 2024-12-18 DIAGNOSIS — O09.291 SUPERVISION OF PREGNANCY WITH OTHER POOR REPRODUCTIVE OR OBSTETRIC HISTORY, FIRST TRIMESTER: ICD-10-CM

## 2024-12-18 DIAGNOSIS — Z11.3 SCREENING FOR STDS (SEXUALLY TRANSMITTED DISEASES): ICD-10-CM

## 2024-12-18 DIAGNOSIS — Z3A.08 8 WEEKS GESTATION OF PREGNANCY: ICD-10-CM

## 2024-12-18 DIAGNOSIS — Z32.01 POSITIVE PREGNANCY TEST: Primary | ICD-10-CM

## 2024-12-18 DIAGNOSIS — E55.9 VITAMIN D DEFICIENCY: ICD-10-CM

## 2024-12-18 DIAGNOSIS — O34.219 PREVIOUS CESAREAN DELIVERY AFFECTING PREGNANCY: ICD-10-CM

## 2024-12-18 DIAGNOSIS — N91.1 SECONDARY AMENORRHEA: ICD-10-CM

## 2024-12-18 LAB
AMPHET UR QL SCN: NEGATIVE
BARBITURATES UR QL SCN: NEGATIVE
BENZODIAZ METAB UR QL SCN: NEGATIVE
CANNABINOIDS UR QL SCN: POSITIVE
CHLAMYDIA BY PCR: NEGATIVE
COCAINE UR QL SCN: NEGATIVE
N. GONORRHOEAE (GC) BY PCR: NEGATIVE
OPIATES UR QL SCN: NEGATIVE
PCP UR QL SCN: NEGATIVE
TRICHOMONAS NAT: NEGATIVE

## 2024-12-18 PROCEDURE — 1159F MED LIST DOCD IN RCRD: CPT | Mod: CPTII,,, | Performed by: ADVANCED PRACTICE MIDWIFE

## 2024-12-18 PROCEDURE — 3075F SYST BP GE 130 - 139MM HG: CPT | Mod: CPTII,,, | Performed by: ADVANCED PRACTICE MIDWIFE

## 2024-12-18 PROCEDURE — 87661 TRICHOMONAS VAGINALIS AMPLIF: CPT | Mod: ,,, | Performed by: CLINICAL MEDICAL LABORATORY

## 2024-12-18 PROCEDURE — 87591 N.GONORRHOEAE DNA AMP PROB: CPT | Mod: ,,, | Performed by: CLINICAL MEDICAL LABORATORY

## 2024-12-18 PROCEDURE — 99214 OFFICE O/P EST MOD 30 MIN: CPT | Mod: PBBFAC,25 | Performed by: ADVANCED PRACTICE MIDWIFE

## 2024-12-18 PROCEDURE — 80307 DRUG TEST PRSMV CHEM ANLYZR: CPT | Mod: ,,, | Performed by: CLINICAL MEDICAL LABORATORY

## 2024-12-18 PROCEDURE — 87086 URINE CULTURE/COLONY COUNT: CPT | Mod: ,,, | Performed by: CLINICAL MEDICAL LABORATORY

## 2024-12-18 PROCEDURE — 3008F BODY MASS INDEX DOCD: CPT | Mod: CPTII,,, | Performed by: ADVANCED PRACTICE MIDWIFE

## 2024-12-18 PROCEDURE — 3079F DIAST BP 80-89 MM HG: CPT | Mod: CPTII,,, | Performed by: ADVANCED PRACTICE MIDWIFE

## 2024-12-18 PROCEDURE — 87491 CHLMYD TRACH DNA AMP PROBE: CPT | Mod: ,,, | Performed by: CLINICAL MEDICAL LABORATORY

## 2024-12-18 PROCEDURE — 0500F INITIAL PRENATAL CARE VISIT: CPT | Mod: S$PBB,,, | Performed by: ADVANCED PRACTICE MIDWIFE

## 2024-12-18 PROCEDURE — 99999 PR PBB SHADOW E&M-EST. PATIENT-LVL IV: CPT | Mod: PBBFAC,,, | Performed by: ADVANCED PRACTICE MIDWIFE

## 2024-12-18 PROCEDURE — 76801 OB US < 14 WKS SINGLE FETUS: CPT | Mod: TC

## 2024-12-18 NOTE — PROGRESS NOTES
"Initial OB Visit    Subjective:      Britney Jarrett is being seen today for her first obstetrical visit.  This is not a planned pregnancy. She is at  8w 1d gestation. Her obstetrical history is significant for  previous  x2, history of Pre E, history of  deliveries x2 . Relationship with FOB: spouse, living together. Patient does intend to breast feed. C/O nausea.   Denies vaginal bleeding, abd pain or cramping.    Pregnancy history reviewed.  Problem list updated.    Menstrual History:  OB History    Para Term  AB Living   6 2   2 3 2   SAB IAB Ectopic Multiple Live Births   1 1     2      # Outcome Date GA Lbr Raul/2nd Weight Sex Type Anes PTL Lv   6 Current            5  23 36w0d  2.608 kg (5 lb 12 oz) M CS-LTranv Spinal Y VALENTIN      Complications: Pre-eclampsia   4  18 36w0d  2.637 kg (5 lb 13 oz) M CS-LTranv EPI  VALENTIN      Birth Comments: Nuchal cord x3   3 AB            2 IAB            1 SAB                 Patient's last menstrual period was 10/03/2024.  EDC by LMP 7/10/25  US today 25  FHTs 169  Single IUP w/ FHT's     Past Medical History:   Diagnosis Date    Migraine headache     Migraines       Past Surgical History:   Procedure Laterality Date     SECTION      INDUCED       TONSILLECTOMY        Current Outpatient Medications   Medication Instructions    PNV no.153/FA/om3/dha/epa/fish (PRENATAL GUMMIES ORAL) Take by mouth.        The following portions of the patient's history were reviewed and updated as appropriate: allergies, current medications, past family history, past medical history, past social history, past surgical history, and problem list.    Review of Systems  Pertinent items are noted in HPI.      Objective:      /88 (BP Location: Right arm, Patient Position: Sitting)   Pulse 101   Resp 19   Ht 5' 2" (1.575 m)   Wt 79.4 kg (175 lb)   LMP 10/03/2024   SpO2 100%   BMI 32.01 kg/m²   General appearance: " alert, appears stated age, cooperative, and no distress  Head: Normocephalic, without obvious abnormality, atraumatic  Lungs: clear to auscultation bilaterally and even/unlabored  Heart: regular rate and rhythm  Abdomen: soft, non-tender  Extremities: extremities normal, atraumatic, no cyanosis or edema  Skin: Skin color, texture, turgor normal. No rashes or lesions      Assessment:     1. Positive pregnancy test    2. 8 weeks gestation of pregnancy    3. Previous  delivery affecting pregnancy    4. Supervision of pregnancy with other poor reproductive or obstetric history, first trimester    5. Screening for STDs (sexually transmitted diseases)    6. Vitamin D deficiency       Plan:   ASA 81mg daily   Initial labs drawn.  Madelin Screen discussed.  Prenatal vitamins daily  New OB booklet given for pt to review.    Discussed midwifery care in collaboration with Dr Galvin and Dr Taylor.    Reviewed healthy diet, exercise during pregnancy and weight gain recommendations.    Safe OTC med list given and reviewed.    Discussed danger s/s to report including bleeding precautions.      Breastfeeding  - Discussed benefits of breastfeeding for mother and baby and limitations of formula  - Educated mother on milk and milk production  - Encouraged to feed infant on demand  - Needs 8-12 feeds in 24 hrs  - Does not need to go more then 4-5 hours with out a feed  - Reviewed feeding cues, feeding patterns and positions  - Encouraged patient to review pregnancy guide for additional information    Encouraged MyCharts Access    Follow up in about 4 weeks (around 1/15/2025) for OBV.

## 2024-12-20 LAB — UA COMPLETE W REFLEX CULTURE PNL UR: NORMAL

## 2024-12-26 ENCOUNTER — PATIENT MESSAGE (OUTPATIENT)
Dept: OBSTETRICS AND GYNECOLOGY | Facility: CLINIC | Age: 34
End: 2024-12-26
Payer: COMMERCIAL

## 2024-12-27 ENCOUNTER — PATIENT MESSAGE (OUTPATIENT)
Dept: OBSTETRICS AND GYNECOLOGY | Facility: CLINIC | Age: 34
End: 2024-12-27
Payer: COMMERCIAL

## 2024-12-27 ENCOUNTER — TELEPHONE (OUTPATIENT)
Dept: OBSTETRICS AND GYNECOLOGY | Facility: CLINIC | Age: 34
End: 2024-12-27
Payer: COMMERCIAL

## 2024-12-27 NOTE — TELEPHONE ENCOUNTER
portal      ----- Message from Kiana sent at 12/26/2024  3:50 PM CST -----  Who Called: Britney Jarrett    Caller is requesting information on test results.    Name of Test (Lab/Mammo/Etc): Non fasting Lab tests  Date of Test: 12/18/2024  Where the test was performed: [facility]  Ordering Provider: Dr. Mckinney    Preferred Method of Contact: Phone Call  Patient's Preferred Phone Number on File: [phone]   Best Call Back Number, if different:  Additional Information:

## 2025-01-09 ENCOUNTER — PATIENT MESSAGE (OUTPATIENT)
Dept: OBSTETRICS AND GYNECOLOGY | Facility: CLINIC | Age: 35
End: 2025-01-09
Payer: COMMERCIAL

## 2025-01-15 ENCOUNTER — ROUTINE PRENATAL (OUTPATIENT)
Dept: OBSTETRICS AND GYNECOLOGY | Facility: CLINIC | Age: 35
End: 2025-01-15
Payer: COMMERCIAL

## 2025-01-15 VITALS
BODY MASS INDEX: 32.56 KG/M2 | HEART RATE: 80 BPM | WEIGHT: 178 LBS | DIASTOLIC BLOOD PRESSURE: 70 MMHG | SYSTOLIC BLOOD PRESSURE: 110 MMHG

## 2025-01-15 DIAGNOSIS — Z98.891 PREVIOUS CESAREAN SECTION: Primary | ICD-10-CM

## 2025-01-15 DIAGNOSIS — O09.292 HX OF PREECLAMPSIA, PRIOR PREGNANCY, CURRENTLY PREGNANT, SECOND TRIMESTER: ICD-10-CM

## 2025-01-15 DIAGNOSIS — R35.0 URINARY FREQUENCY: ICD-10-CM

## 2025-01-15 DIAGNOSIS — Z3A.12 12 WEEKS GESTATION OF PREGNANCY: ICD-10-CM

## 2025-01-15 LAB
BILIRUB SERPL-MCNC: NORMAL MG/DL
BLOOD URINE, POC: NORMAL
CLARITY, UA: NORMAL
COLOR, UA: NORMAL
GLUCOSE UR QL STRIP: NORMAL
KETONES UR QL STRIP: NORMAL
LEUKOCYTE ESTERASE URINE, POC: NORMAL
NITRITE, POC UA: NORMAL
PH, POC UA: 6.5
PROTEIN, POC: NORMAL
SPECIFIC GRAVITY, POC UA: 1.02
UROBILINOGEN, POC UA: NORMAL

## 2025-01-15 PROCEDURE — 0502F SUBSEQUENT PRENATAL CARE: CPT | Mod: CPTII,,, | Performed by: ADVANCED PRACTICE MIDWIFE

## 2025-01-15 PROCEDURE — 99999 PR PBB SHADOW E&M-EST. PATIENT-LVL III: CPT | Mod: PBBFAC,,, | Performed by: ADVANCED PRACTICE MIDWIFE

## 2025-01-15 PROCEDURE — 99213 OFFICE O/P EST LOW 20 MIN: CPT | Mod: PBBFAC | Performed by: ADVANCED PRACTICE MIDWIFE

## 2025-01-15 NOTE — PROGRESS NOTES
Return OB Visit    34 y.o. female  at 12w1d   She c/o nausea. Madelin drawn today.  Denies any vaginal bleeding, leakage of fluid, cramping, contractions, or pressure.   Total weight gain/weight loss in pregnancy: 1.361 kg (3 lb)     Vitals  BP: 110/70  Pulse: 80  Weight: 80.7 kg (178 lb)  Prenatal  Fetal Heart Rate: 140s  Movement: Absent  Edema  LLE Edema: None  RLE Edema: None  Facial: None  Additional Edema?: No    Prenatal Labs:  Lab Results   Component Value Date    GROUPTRH A POS 2024    HGB 12.1 2024    HCT 38.2 2024     2024    HEPBSAG Non-Reactive 2024    [id number] Non-Reactive 2024    LABNGO Negative 2024    LABURIN Skin/Urogenital Juanita Isolated, no further workup. 2024       A: 12w1d           ICD-10-CM ICD-9-CM    1. Previous  section  Z98.891 V45.89       2. 12 weeks gestation of pregnancy  Z3A.12 V22.2 POCT URINALYSIS W/O SCOPE      3. Hx of preeclampsia, prior pregnancy, currently pregnant, second trimester  O09.292 V23.49       4. Urinary frequency  R35.0 788.41 POCT URINALYSIS W/O SCOPE          No pregnancy checklist tasks were completed during this visit, and no tasks are pending completion.    -Benefits of breastfeeding   -Rooming In and Skin to Skin    P: Bleeding and  labor/labor precautions discussed.    Questions answered to desired level of satisfaction  Verbalized understanding to all information and instructions provided.  Follow up in about 4 weeks (around 2025) for OBV.    Karli Mckinney CNM, KATIA-BC

## 2025-02-12 ENCOUNTER — ROUTINE PRENATAL (OUTPATIENT)
Dept: OBSTETRICS AND GYNECOLOGY | Facility: CLINIC | Age: 35
End: 2025-02-12
Payer: COMMERCIAL

## 2025-02-12 VITALS
SYSTOLIC BLOOD PRESSURE: 116 MMHG | BODY MASS INDEX: 33.32 KG/M2 | DIASTOLIC BLOOD PRESSURE: 73 MMHG | HEART RATE: 118 BPM | WEIGHT: 182.19 LBS

## 2025-02-12 DIAGNOSIS — Z98.891 PREVIOUS CESAREAN SECTION: Primary | ICD-10-CM

## 2025-02-12 DIAGNOSIS — R35.0 URINARY FREQUENCY: ICD-10-CM

## 2025-02-12 DIAGNOSIS — Z03.79 ENCOUNTER FOR OTHER SUSPECTED MATERNAL AND FETAL CONDITIONS RULED OUT: ICD-10-CM

## 2025-02-12 DIAGNOSIS — Z3A.16 16 WEEKS GESTATION OF PREGNANCY: ICD-10-CM

## 2025-02-12 DIAGNOSIS — O09.292 HX OF PREECLAMPSIA, PRIOR PREGNANCY, CURRENTLY PREGNANT, SECOND TRIMESTER: ICD-10-CM

## 2025-02-12 LAB
BILIRUB SERPL-MCNC: NORMAL MG/DL
BLOOD URINE, POC: NORMAL
CLARITY, UA: NORMAL
COLOR, UA: NORMAL
GLUCOSE UR QL STRIP: NORMAL
KETONES UR QL STRIP: NORMAL
LEUKOCYTE ESTERASE URINE, POC: NORMAL
NITRITE, POC UA: NORMAL
PH, POC UA: 6
PROTEIN, POC: NORMAL
SPECIFIC GRAVITY, POC UA: 1.02
UROBILINOGEN, POC UA: NORMAL

## 2025-02-12 PROCEDURE — 99213 OFFICE O/P EST LOW 20 MIN: CPT | Mod: PBBFAC | Performed by: ADVANCED PRACTICE MIDWIFE

## 2025-02-12 PROCEDURE — 0502F SUBSEQUENT PRENATAL CARE: CPT | Mod: CPTII,,, | Performed by: ADVANCED PRACTICE MIDWIFE

## 2025-02-12 PROCEDURE — 99999 PR PBB SHADOW E&M-EST. PATIENT-LVL III: CPT | Mod: PBBFAC,,, | Performed by: ADVANCED PRACTICE MIDWIFE

## 2025-02-12 NOTE — PROGRESS NOTES
Return OB Visit    34 y.o. female  at 16w1d   She c/o daily h'aches.  Is taking tylenol and has tried magnesium, 1 tablet.  Increase Magnesium to 2-3 tablets at bedtime.  Okay to take Ibuprofen OTC prn h'ache up until 20 weeks gestation.   Reports occasional fetal movement or fluttering. Denies any vaginal bleeding, leakage of fluid, cramping, contractions, or pressure.   Total weight gain/weight loss in pregnancy: 3.266 kg (7 lb 3.2 oz)     Vitals  BP: 116/73  Pulse: (!) 118  Weight: 82.6 kg (182 lb 3.2 oz)  Prenatal  Fetal Heart Rate: 140s  Movement: Present  Edema  LLE Edema: None  RLE Edema: None  Facial: None    Prenatal Labs:  Lab Results   Component Value Date    GROUPTRH A POS 2024    HGB 12.1 2024    HCT 38.2 2024     2024    HEPBSAG Non-Reactive 2024    [id number] Non-Reactive 2024    LABNGO Negative 2024    LABURIN Skin/Urogenital Juanita Isolated, no further workup. 2024       A: 16w1d           ICD-10-CM ICD-9-CM    1. Previous  section  Z98.891 V45.89       2. 16 weeks gestation of pregnancy  Z3A.16 V22.2 POCT URINALYSIS W/O SCOPE      3. Hx of preeclampsia, prior pregnancy, currently pregnant, second trimester  O09.292 V23.49 US OB 14+ Wks, TransAbd, Single Gestation      4. Urinary frequency  R35.0 788.41 POCT URINALYSIS W/O SCOPE      5. Encounter for other suspected maternal and fetal conditions ruled out  Z03.79 V89.09 US OB 14+ Wks, TransAbd, Single Gestation        No pregnancy checklist tasks were completed during this visit, and no tasks are pending completion.    -Benefits of breastfeeding   -Rooming In and Skin to Skin    P: Bleeding, daily fetal kick counts, and  labor/labor precautions discussed.    Questions answered to desired level of satisfaction  Verbalized understanding to all information and instructions provided.  Follow up in about 4 weeks (around 3/12/2025) for OBV, Ultrasound (Pupa).    Karli Mckinney  CNM, NP-BC

## 2025-02-18 ENCOUNTER — OFFICE VISIT (OUTPATIENT)
Dept: FAMILY MEDICINE | Facility: CLINIC | Age: 35
End: 2025-02-18
Payer: COMMERCIAL

## 2025-02-18 ENCOUNTER — TELEPHONE (OUTPATIENT)
Dept: OBSTETRICS AND GYNECOLOGY | Facility: CLINIC | Age: 35
End: 2025-02-18
Payer: COMMERCIAL

## 2025-02-18 VITALS
TEMPERATURE: 98 F | RESPIRATION RATE: 18 BRPM | HEART RATE: 87 BPM | HEIGHT: 62 IN | BODY MASS INDEX: 34.11 KG/M2 | OXYGEN SATURATION: 98 % | WEIGHT: 185.38 LBS | DIASTOLIC BLOOD PRESSURE: 70 MMHG | SYSTOLIC BLOOD PRESSURE: 105 MMHG

## 2025-02-18 DIAGNOSIS — M54.9 BACK PAIN, UNSPECIFIED BACK LOCATION, UNSPECIFIED BACK PAIN LATERALITY, UNSPECIFIED CHRONICITY: Primary | ICD-10-CM

## 2025-02-18 PROBLEM — J02.9 PHARYNGITIS: Status: RESOLVED | Noted: 2023-02-21 | Resolved: 2025-02-18

## 2025-02-18 LAB
BILIRUB SERPL-MCNC: NEGATIVE MG/DL
BLOOD URINE, POC: NEGATIVE
CLARITY, UA: CLEAR
COLOR, UA: YELLOW
GLUCOSE UR QL STRIP: NEGATIVE
KETONES UR QL STRIP: NEGATIVE
LEUKOCYTE ESTERASE URINE, POC: NEGATIVE
NITRITE, POC UA: NEGATIVE
PH, POC UA: 7
PROTEIN, POC: NEGATIVE
SPECIFIC GRAVITY, POC UA: 1.01
UROBILINOGEN, POC UA: NORMAL

## 2025-02-18 RX ORDER — LANOLIN ALCOHOL/MO/W.PET/CERES
400 CREAM (GRAM) TOPICAL DAILY
COMMUNITY

## 2025-02-18 RX ORDER — CHOLECALCIFEROL (VITAMIN D3) 25 MCG
1000 TABLET ORAL DAILY
COMMUNITY

## 2025-02-18 NOTE — TELEPHONE ENCOUNTER
Patient states she is having lower back and side pain. Tylenol , rest, hydration, warm bath not helping to relive the pain. Denies any pain or burning with urination or trouble with urination. Recommends coming to ER to be evaluated. Patient voices understanding. No further questions at this time.

## 2025-02-18 NOTE — PROGRESS NOTES
MARILU Stern   [facility]  [address]  [place] [address]     PATIENT NAME: Britney Jarrett  : 1990  DATE: 25  MRN: [id number]      Billing Provider: MARILU Stern  Level of Service:   Patient PCP Information       Provider PCP Type    Primary Doctor No General            Reason for Visit / Chief Complaint: Back Pain (Pt is 17 weeks pregnant and has had some back pain over the weekend and she called her OB this morning and they wanted her to go to the Er for possible UTI. Pt states she was not able to go to the ER due to having a 1 yr old and no one to keep him so she just wanted to come here and get checked for a UTI)   Health Maintenance Due   Topic Date Due    Lipid Panel  Never done    TETANUS VACCINE  Never done    Cervical Cancer Screening  10/14/2018    Influenza Vaccine (1) 2024    COVID-19 Vaccine ( season) Never done          Update PCP  Update Chief Complaint         History of Present Illness / Problem Focused Workflow     History of Present Illness    CHIEF COMPLAINT:  Patient presents today with right-sided back pain during pregnancy. Pt states she called her OB, AZEB Mckinney, this AM regarding her symptoms and was told to go to the ER. Pt states she has no one to watch her one year old as her  works out of town and her mother works so she could not go to the ER right now. She mainly just wanted to come to see if she had a uti even though she does not have any urinary symptoms.     HISTORY OF PRESENT ILLNESS:  She reports intermittent sharp pain in right side and back, which was particularly severe over the weekend. She obtained some relief with warm showers. She attributes symptoms to possible overexertion from bending and lifting while retrieving Girl  cookies on Thursday. She also has a 1 year old with her today and has to pick him up at times.     OBSTETRIC HISTORY:  She reports this is her third pregnancy, noting it has been the easiest  "compared to her previous two high-risk pregnancies. Due to her previous high-risk pregnancies, she feels anxious during this pregnancy.          No results found for: "HGBA1C"     CMP  Sodium   Date Value Ref Range Status   12/18/2024 139 136 - 145 mmol/L Final     Potassium   Date Value Ref Range Status   12/18/2024 3.9 3.5 - 5.1 mmol/L Final     Chloride   Date Value Ref Range Status   12/18/2024 105 98 - 107 mmol/L Final     CO2   Date Value Ref Range Status   12/18/2024 27 22 - 29 mmol/L Final     Glucose   Date Value Ref Range Status   12/18/2024 81 74 - 100 mg/dL Final     BUN   Date Value Ref Range Status   12/18/2024 9 7 - 19 mg/dL Final     Creatinine   Date Value Ref Range Status   12/18/2024 0.60 0.55 - 1.02 mg/dL Final     Calcium   Date Value Ref Range Status   12/18/2024 8.9 8.4 - 10.2 mg/dL Final     Anion Gap   Date Value Ref Range Status   12/18/2024 11 7 - 16 mmol/L Final     eGFR   Date Value Ref Range Status   12/18/2024 122 >=60 mL/min/1.73m2 Final        Lab Results   Component Value Date    WBC 9.02 12/18/2024    RBC 4.19 (L) 12/18/2024    HGB 12.1 12/18/2024    HCT 38.2 12/18/2024    MCV 91.2 12/18/2024    MCH 28.9 12/18/2024    MCHC 31.7 (L) 12/18/2024    RDW 13.0 12/18/2024     12/18/2024    MPV 10.1 12/18/2024    LYMPH 16.2 (L) 12/18/2024    LYMPH 1.46 12/18/2024    MONO 13.6 (H) 12/18/2024    EOS 0.22 12/18/2024    BASO 0.04 12/18/2024    EOSINOPHIL 2.4 12/18/2024    BASOPHIL 0.4 12/18/2024        No results found for: "CHOL"  No results found for: "HDL"  No results found for: "LDLCALC"  No results found for: "TRIG"  No results found for: "CHOLHDL"     Wt Readings from Last 3 Encounters:   02/18/25 1006 84.1 kg (185 lb 6.4 oz)   02/12/25 1052 82.6 kg (182 lb 3.2 oz)   01/15/25 1050 80.7 kg (178 lb)        BP Readings from Last 3 Encounters:   02/18/25 105/70   02/12/25 116/73   01/15/25 110/70        Review of Systems     Review of Systems       Medical / Social / Family History " "    Past Medical History:   Diagnosis Date    Migraine headache     Migraines        Past Surgical History:   Procedure Laterality Date     SECTION      INDUCED       TONSILLECTOMY         Social History  Ms.  reports that she has never smoked. She has never been exposed to tobacco smoke. She has never used smokeless tobacco. She reports that she does not currently use alcohol. She reports that she does not use drugs.    Family History  Ms.'s family history includes Hypertension in her father.    Medications and Allergies     Medications  Outpatient Medications Marked as Taking for the 25 encounter (Office Visit) with Luisana Christianson FNP   Medication Sig Dispense Refill    magnesium oxide (MAG-OX) 400 mg (241.3 mg magnesium) tablet Take 400 mg by mouth once daily.      PNV,calcium 72-iron-folic acid (PRENATAL VITAMIN PLUS LOW IRON) 27 mg iron- 1 mg Tab Take 1 tablet by mouth.      vitamin D (VITAMIN D3) 1000 units Tab Take 1,000 Units by mouth once daily.         Allergies  Review of patient's allergies indicates:  No Known Allergies    Physical Examination     Vitals:    25 1006   BP: 105/70   BP Location: Left arm   Patient Position: Sitting   Pulse: 87   Resp: 18   Temp: 98.1 °F (36.7 °C)   TempSrc: Oral   SpO2: 98%   Weight: 84.1 kg (185 lb 6.4 oz)   Height: 5' 2" (1.575 m)      Physical Exam  Constitutional:       Appearance: Normal appearance.   HENT:      Head: Normocephalic.   Eyes:      Extraocular Movements: Extraocular movements intact.   Cardiovascular:      Rate and Rhythm: Normal rate and regular rhythm.      Pulses: Normal pulses.      Heart sounds: Normal heart sounds.   Pulmonary:      Effort: Pulmonary effort is normal.      Breath sounds: Normal breath sounds.   Skin:     General: Skin is warm and dry.      Capillary Refill: Capillary refill takes less than 2 seconds.   Neurological:      General: No focal deficit present.      Mental Status: She is alert and oriented to " person, place, and time.   Psychiatric:         Mood and Affect: Mood normal.         Behavior: Behavior normal.          Assessment and Plan (including Health Maintenance)      Problem List  Smart Sets  Document Outside HM   :    Plan:     There are no Patient Instructions on file for this visit.       Health Maintenance Due   Topic Date Due    Lipid Panel  Never done    TETANUS VACCINE  Never done    Cervical Cancer Screening  10/14/2018    Influenza Vaccine (1) 09/01/2024    COVID-19 Vaccine (1 - 2024-25 season) Never done       Problem List Items Addressed This Visit       Back pain - Primary    Relevant Orders    POCT URINALYSIS W/O SCOPE (Completed)     Assessment & Plan    IMPRESSION:  - Assessed patient's symptoms of intermittent right-sided pain and lower back pain during pregnancy  - Considered muscular pain or round ligament pain as potential causes  - Decided against ER visit based on current symptoms and normal urine test  - Added urine culture to rule out UTI, though considered unlikely given normal urinalysis and lack of urinary symptoms  - Noted patient's blood pressure as normal, a positive indicator    PREGNANCY-RELATED PAIN:  - Evaluated the patient's reported sharp pain on the right side and back that comes and goes.  - Assessed the pain as potentially muscular or round ligament pain.  - Educated the patient about round ligament pain as a potential cause of discomfort during pregnancy.  - Recommend alternating acetaminophen and ibuprofen for pain management.  - Advised the use of heating pads for comfort.  - Suggested conservative management due to pregnancy.  - Instructed the patient to contact the office if pain becomes excruciating or symptoms worsen.  - Evaluated the patient's reported lower back pain that concerned her over the weekend.  - Assessed the sharp pain in the back that comes and goes.  - Considered the possibility of muscular pain.  - Inquired about the nature of the pain and its  relation to movement.  - Explored the possibility of pain being related to recent activities.  - Recommend alternating acetaminophen and ibuprofen for pain management.  - Advised the patient to alternate between heat and cold therapy for pain relief.  - Encouraged the patient to continue using heating pads as previously done.  - Suggested conservative management for the condition.  - Instructed the patient to go to the ER as instructed if pain becomes excruciating or symptoms worsen.    VITAL SIGNS:  - Measured blood pressure, which was normal.    URINALYSIS:  - Performed urinalysis, which yielded normal results.    SUSPECTED UTI:  - Evaluated the patient, who was initially suspected of having a UTI.  - Performed urinalysis, which yielded normal results, showing no signs of UTI.  - Noted that the patient reports no urinary symptoms.  - Ordered a urine culture to further rule out UTI.  - Instructed the patient to contact the office if symptoms worsen.        Back pain, unspecified back location, unspecified back pain laterality, unspecified chronicity  -     Cancel: POCT Urine Drug Screen Presump  -     POCT URINALYSIS W/O SCOPE       The patient has no Health Maintenance topics of status Not Due      Future Appointments   Date Time Provider Department Center   3/19/2025  9:00 AM [address] [facility]   3/19/2025 10:15 AM Karli Mckinney CNM [facility]        No follow-ups on file.    Health Maintenance Due   Topic Date Due    Lipid Panel  Never done    TETANUS VACCINE  Never done    Cervical Cancer Screening  10/14/2018    Influenza Vaccine (1) 09/01/2024    COVID-19 Vaccine (1 - 2024-25 season) Never done        Signature:  MARILU Stern    Date of encounter: 2/18/25  This note was generated with the assistance of ambient listening technology. Verbal consent was obtained by the patient and accompanying visitor(s) for the recording of patient appointment to facilitate this note. I attest  to having reviewed and edited the generated note for accuracy, though some syntax or spelling errors may persist. Please contact the author of this note for any clarification.

## 2025-02-18 NOTE — TELEPHONE ENCOUNTER
----- Message from Karli sent at 2/18/2025  8:18 AM CST -----  Regarding: same day appointment  Who Called: Britney Escobedo is requesting a same day appointment. Caller declined first available appointment listed below.  When is the first available appointment?Feb 20 Options offered (Virtual Visit, Urgent Care): n/aSymptoms or reason for appointment: sharp pains in back and on sides 17 w pregnant Preferred Method of Contact: Phone CallPatient's Preferred Phone Number on File: [phone] Best Call Back Number, if different:Additional Information:

## 2025-03-19 ENCOUNTER — ROUTINE PRENATAL (OUTPATIENT)
Dept: OBSTETRICS AND GYNECOLOGY | Facility: CLINIC | Age: 35
End: 2025-03-19
Payer: COMMERCIAL

## 2025-03-19 ENCOUNTER — HOSPITAL ENCOUNTER (OUTPATIENT)
Dept: RADIOLOGY | Facility: HOSPITAL | Age: 35
Discharge: HOME | End: 2025-03-19
Attending: ADVANCED PRACTICE MIDWIFE
Payer: COMMERCIAL

## 2025-03-19 VITALS
SYSTOLIC BLOOD PRESSURE: 112 MMHG | HEART RATE: 95 BPM | DIASTOLIC BLOOD PRESSURE: 72 MMHG | WEIGHT: 193 LBS | BODY MASS INDEX: 35.3 KG/M2

## 2025-03-19 DIAGNOSIS — Z3A.21 21 WEEKS GESTATION OF PREGNANCY: ICD-10-CM

## 2025-03-19 DIAGNOSIS — R35.0 URINARY FREQUENCY: ICD-10-CM

## 2025-03-19 DIAGNOSIS — O09.292 HX OF PREECLAMPSIA, PRIOR PREGNANCY, CURRENTLY PREGNANT, SECOND TRIMESTER: ICD-10-CM

## 2025-03-19 DIAGNOSIS — Z03.79 ENCOUNTER FOR OTHER SUSPECTED MATERNAL AND FETAL CONDITIONS RULED OUT: ICD-10-CM

## 2025-03-19 DIAGNOSIS — Z98.891 PREVIOUS CESAREAN SECTION: Primary | ICD-10-CM

## 2025-03-19 LAB
BILIRUB SERPL-MCNC: NORMAL MG/DL
BLOOD URINE, POC: NORMAL
CLARITY, UA: CLEAR
COLOR, UA: YELLOW
GLUCOSE UR QL STRIP: NORMAL
KETONES UR QL STRIP: NORMAL
LEUKOCYTE ESTERASE URINE, POC: NORMAL
NITRITE, POC UA: NORMAL
PH, POC UA: 5
PROTEIN, POC: NORMAL
SPECIFIC GRAVITY, POC UA: 1.02
UROBILINOGEN, POC UA: NORMAL

## 2025-03-19 PROCEDURE — 76805 OB US >/= 14 WKS SNGL FETUS: CPT | Mod: 26,,, | Performed by: RADIOLOGY

## 2025-03-19 PROCEDURE — 76805 OB US >/= 14 WKS SNGL FETUS: CPT | Mod: TC

## 2025-03-19 PROCEDURE — 99999 PR PBB SHADOW E&M-EST. PATIENT-LVL III: CPT | Mod: PBBFAC,,, | Performed by: ADVANCED PRACTICE MIDWIFE

## 2025-03-19 PROCEDURE — 0502F SUBSEQUENT PRENATAL CARE: CPT | Mod: CPTII,,, | Performed by: ADVANCED PRACTICE MIDWIFE

## 2025-03-19 PROCEDURE — 99213 OFFICE O/P EST LOW 20 MIN: CPT | Mod: PBBFAC,25 | Performed by: ADVANCED PRACTICE MIDWIFE

## 2025-03-19 NOTE — PROGRESS NOTES
Return OB Visit    34 y.o. female  at 21w1d   She denies complaints.  US with Dr Elizabeth prior to visit today.   Reports good fetal movement or fluttering. Denies any vaginal bleeding, leakage of fluid, cramping, contractions, or pressure.   Total weight gain/weight loss in pregnancy: 8.165 kg (18 lb)     Vitals  BP: 112/72  Pulse: 95  Weight: 87.5 kg (193 lb)  Prenatal  Fetal Heart Rate: 140s  Movement: Present  Edema  LLE Edema: None  RLE Edema: None  Facial: Other (Comment)  Additional Edema?: No    Prenatal Labs:  Lab Results   Component Value Date    GROUPTRH A POS 2024    HGB 12.1 2024    HCT 38.2 2024     2024    HEPBSAG Non-Reactive 2024    [id number] Non-Reactive 2024    LABNGO Negative 2024    LABURIN Skin/Urogenital Juanita Isolated, no further workup. 2024     A: 21w1d           ICD-10-CM ICD-9-CM    1. Previous  section  Z98.891 V45.89       2. 21 weeks gestation of pregnancy  Z3A.21 V22.2 POCT URINALYSIS W/O SCOPE      3. Hx of preeclampsia, prior pregnancy, currently pregnant, second trimester  O09.292 V23.49       4. Urinary frequency  R35.0 788.41 POCT URINALYSIS W/O SCOPE          No pregnancy checklist tasks were completed during this visit, and no tasks are pending completion.    -Benefits of breastfeeding   -Rooming In and Skin to Skin    P: Bleeding, daily fetal kick counts, and  labor/labor precautions discussed.    Questions answered to desired level of satisfaction  Verbalized understanding to all information and instructions provided.  Follow up in about 4 weeks (around 2025) for OBV.    Karli Mckinney CNM, KATIA-BC

## 2025-04-16 ENCOUNTER — ROUTINE PRENATAL (OUTPATIENT)
Dept: OBSTETRICS AND GYNECOLOGY | Facility: CLINIC | Age: 35
End: 2025-04-16
Payer: COMMERCIAL

## 2025-04-16 VITALS
DIASTOLIC BLOOD PRESSURE: 70 MMHG | BODY MASS INDEX: 36.21 KG/M2 | HEART RATE: 79 BPM | SYSTOLIC BLOOD PRESSURE: 112 MMHG | WEIGHT: 198 LBS

## 2025-04-16 DIAGNOSIS — Z3A.25 25 WEEKS GESTATION OF PREGNANCY: ICD-10-CM

## 2025-04-16 DIAGNOSIS — R35.0 URINARY FREQUENCY: ICD-10-CM

## 2025-04-16 DIAGNOSIS — O09.292 HX OF PREECLAMPSIA, PRIOR PREGNANCY, CURRENTLY PREGNANT, SECOND TRIMESTER: ICD-10-CM

## 2025-04-16 DIAGNOSIS — Z98.891 PREVIOUS CESAREAN SECTION: Primary | ICD-10-CM

## 2025-04-16 LAB
BILIRUB SERPL-MCNC: NORMAL MG/DL
BLOOD URINE, POC: NORMAL
CLARITY, UA: CLEAR
COLOR, UA: YELLOW
GLUCOSE UR QL STRIP: NORMAL
KETONES UR QL STRIP: NORMAL
LEUKOCYTE ESTERASE URINE, POC: NORMAL
NITRITE, POC UA: NORMAL
PH, POC UA: 6.5
PROTEIN, POC: NORMAL
SPECIFIC GRAVITY, POC UA: 1.02
UROBILINOGEN, POC UA: NORMAL

## 2025-04-16 PROCEDURE — 0502F SUBSEQUENT PRENATAL CARE: CPT | Mod: CPTII,,, | Performed by: ADVANCED PRACTICE MIDWIFE

## 2025-04-16 PROCEDURE — 99213 OFFICE O/P EST LOW 20 MIN: CPT | Mod: PBBFAC | Performed by: ADVANCED PRACTICE MIDWIFE

## 2025-04-16 PROCEDURE — 99999 PR PBB SHADOW E&M-EST. PATIENT-LVL III: CPT | Mod: PBBFAC,,, | Performed by: ADVANCED PRACTICE MIDWIFE

## 2025-04-16 NOTE — PROGRESS NOTES
Return OB Visit    34 y.o. female  at 25w1d   She denies any complaints.   Reports good fetal movement or fluttering. Denies any vaginal bleeding, leakage of fluid, cramping, contractions, or pressure.   Total weight gain/weight loss in pregnancy: 10.4 kg (23 lb)     Vitals  BP: 112/70  Pulse: 79  Weight: 89.8 kg (198 lb)  Prenatal  Fetal Heart Rate: 140s  Movement: Present  Edema  LLE Edema: None  RLE Edema: None  Facial: None  Additional Edema?: No    Prenatal Labs:  Lab Results   Component Value Date    GROUPTRH A POS 2024    HGB 12.1 2024    HCT 38.2 2024     2024    HEPBSAG Non-Reactive 2024    [id number] Non-Reactive 2024    LABNGO Negative 2024    LABURIN Skin/Urogenital Juanita Isolated, no further workup. 2024       A: 25w1d           ICD-10-CM ICD-9-CM    1. Previous  section  Z98.891 V45.89 Glucose, 1Hr Post Prandial      Treponema Pallidum (Syphillis) Antibody      CBC Auto Differential      2. 25 weeks gestation of pregnancy  Z3A.25 V22.2 POCT URINALYSIS W/O SCOPE      3. Hx of preeclampsia, prior pregnancy, currently pregnant, second trimester  O09.292 V23.49 Glucose, 1Hr Post Prandial      Treponema Pallidum (Syphillis) Antibody      CBC Auto Differential      4. Urinary frequency  R35.0 788.41 POCT URINALYSIS W/O SCOPE          The following were addressed during this visit:    21-24 Weeks  -  Labor Signs   - Travel During Pregnancy   - Gestational diabetes screening protocol   - Effective Position and Latch   - Risks of Formula Use   - Risks of pacifier use     -Benefits of breastfeeding   -Rooming In and Skin to Skin    P: Bleeding, daily fetal kick counts, and  labor/labor precautions discussed.    Questions answered to desired level of satisfaction  Verbalized understanding to all information and instructions provided.  Follow up in about 3 weeks (around 2025) for OBV.    Karli Mckinney CNM,  WHNP-BC

## 2025-05-07 ENCOUNTER — ROUTINE PRENATAL (OUTPATIENT)
Dept: OBSTETRICS AND GYNECOLOGY | Facility: CLINIC | Age: 35
End: 2025-05-07
Payer: COMMERCIAL

## 2025-05-07 VITALS
HEART RATE: 79 BPM | WEIGHT: 199 LBS | BODY MASS INDEX: 36.4 KG/M2 | DIASTOLIC BLOOD PRESSURE: 72 MMHG | SYSTOLIC BLOOD PRESSURE: 116 MMHG

## 2025-05-07 DIAGNOSIS — Z3A.28 28 WEEKS GESTATION OF PREGNANCY: ICD-10-CM

## 2025-05-07 DIAGNOSIS — Z98.891 PREVIOUS CESAREAN SECTION: Primary | ICD-10-CM

## 2025-05-07 DIAGNOSIS — O99.013 ANEMIA DURING PREGNANCY IN THIRD TRIMESTER: ICD-10-CM

## 2025-05-07 DIAGNOSIS — O99.213 OBESITY AFFECTING PREGNANCY IN THIRD TRIMESTER, UNSPECIFIED OBESITY TYPE: ICD-10-CM

## 2025-05-07 DIAGNOSIS — O09.293 HX OF PREECLAMPSIA, PRIOR PREGNANCY, CURRENTLY PREGNANT, THIRD TRIMESTER: ICD-10-CM

## 2025-05-07 DIAGNOSIS — R35.0 URINARY FREQUENCY: ICD-10-CM

## 2025-05-07 PROCEDURE — 0502F SUBSEQUENT PRENATAL CARE: CPT | Mod: CPTII,,, | Performed by: ADVANCED PRACTICE MIDWIFE

## 2025-05-07 PROCEDURE — 99213 OFFICE O/P EST LOW 20 MIN: CPT | Mod: PBBFAC | Performed by: ADVANCED PRACTICE MIDWIFE

## 2025-05-07 PROCEDURE — 99999 PR PBB SHADOW E&M-EST. PATIENT-LVL III: CPT | Mod: PBBFAC,,, | Performed by: ADVANCED PRACTICE MIDWIFE

## 2025-05-07 RX ORDER — FERROUS SULFATE 325(65) MG
325 TABLET, DELAYED RELEASE (ENTERIC COATED) ORAL 2 TIMES DAILY
Qty: 60 TABLET | Refills: 11 | Status: SHIPPED | OUTPATIENT
Start: 2025-05-07

## 2025-05-07 NOTE — PROGRESS NOTES
Return OB Visit    34 y.o. female  at 28w1d   She denies complaints.  1hr GTT today.  Results available and reviewed.  Rx for Ferrous Sulfate escripted and discussed how to take.   Reports good fetal movement or fluttering. Denies any vaginal bleeding, leakage of fluid, cramping, contractions, or pressure.   Total weight gain/weight loss in pregnancy: 10.9 kg (24 lb)     Vitals  BP: 116/72  Pulse: 79  Weight: 90.3 kg (199 lb)  Prenatal  Fetal Heart Rate: 150s  Movement: Present  Edema  LLE Edema: None  RLE Edema: None  Facial: None  Additional Edema?: No    Prenatal Labs:  Lab Results   Component Value Date    GROUPTRH A POS 2024    HGB 10.4 (L) 2025    HCT 32.7 (L) 2025     2025    HEPBSAG Non-Reactive 2024    [id number] Non-Reactive 2024    LABNGO Negative 2024    LABURIN Skin/Urogenital Juanita Isolated, no further workup. 2024       A: 28w1d           ICD-10-CM ICD-9-CM    1. Previous  section  Z98.891 V45.89       2. 28 weeks gestation of pregnancy  Z3A.28 V22.2 POCT URINALYSIS W/O SCOPE      3. Hx of preeclampsia, prior pregnancy, currently pregnant, third trimester  O09.293 V23.49 US OB/GYN Procedure ([facility]) - Extended List - Future      4. Urinary frequency  R35.0 788.41 POCT URINALYSIS W/O SCOPE      5. Obesity affecting pregnancy in third trimester, unspecified obesity type  O99.213 649.13 US OB/GYN Procedure ([facility]) - Extended List - Future      6. Anemia during pregnancy in third trimester  O99.013 648.23 ferrous sulfate 325 (65 FE) MG EC tablet          No pregnancy checklist tasks were completed during this visit, and no tasks are pending completion.    -Benefits of breastfeeding   -Rooming In and Skin to Skin    P: Bleeding, daily fetal kick counts, and  labor/labor precautions discussed.    Questions answered to desired level of satisfaction  Verbalized understanding to all information and instructions  provided.  Follow up in about 2 weeks (around 5/21/2025) for OBV, Ultrasound (obesity).    Karli Mckinney CNM, WHNP-BC

## 2025-05-21 ENCOUNTER — HOSPITAL ENCOUNTER (OUTPATIENT)
Dept: OBSTETRICS AND GYNECOLOGY | Facility: CLINIC | Age: 35
Discharge: HOME | End: 2025-05-21
Payer: COMMERCIAL

## 2025-05-21 ENCOUNTER — ROUTINE PRENATAL (OUTPATIENT)
Dept: OBSTETRICS AND GYNECOLOGY | Facility: CLINIC | Age: 35
End: 2025-05-21
Payer: COMMERCIAL

## 2025-05-21 VITALS
WEIGHT: 203 LBS | DIASTOLIC BLOOD PRESSURE: 70 MMHG | BODY MASS INDEX: 37.13 KG/M2 | SYSTOLIC BLOOD PRESSURE: 120 MMHG | HEART RATE: 98 BPM

## 2025-05-21 DIAGNOSIS — Z3A.30 30 WEEKS GESTATION OF PREGNANCY: ICD-10-CM

## 2025-05-21 DIAGNOSIS — R35.0 URINARY FREQUENCY: ICD-10-CM

## 2025-05-21 DIAGNOSIS — O99.013 ANEMIA DURING PREGNANCY IN THIRD TRIMESTER: ICD-10-CM

## 2025-05-21 DIAGNOSIS — Z98.891 PREVIOUS CESAREAN SECTION: Primary | ICD-10-CM

## 2025-05-21 DIAGNOSIS — O99.213 OBESITY AFFECTING PREGNANCY IN THIRD TRIMESTER, UNSPECIFIED OBESITY TYPE: ICD-10-CM

## 2025-05-21 DIAGNOSIS — O09.293 HX OF PREECLAMPSIA, PRIOR PREGNANCY, CURRENTLY PREGNANT, THIRD TRIMESTER: ICD-10-CM

## 2025-05-21 LAB
BILIRUB SERPL-MCNC: NORMAL MG/DL
BLOOD URINE, POC: NORMAL
CLARITY, UA: CLEAR
COLOR, UA: YELLOW
GLUCOSE UR QL STRIP: NORMAL
KETONES UR QL STRIP: NORMAL
LEUKOCYTE ESTERASE URINE, POC: NORMAL
NITRITE, POC UA: NORMAL
PH, POC UA: 6
PROTEIN, POC: NORMAL
SPECIFIC GRAVITY, POC UA: 1.02
UROBILINOGEN, POC UA: NORMAL

## 2025-05-21 PROCEDURE — 0502F SUBSEQUENT PRENATAL CARE: CPT | Mod: CPTII,,, | Performed by: ADVANCED PRACTICE MIDWIFE

## 2025-05-21 PROCEDURE — 99999 PR PBB SHADOW E&M-EST. PATIENT-LVL III: CPT | Mod: PBBFAC,,, | Performed by: ADVANCED PRACTICE MIDWIFE

## 2025-05-21 PROCEDURE — 99213 OFFICE O/P EST LOW 20 MIN: CPT | Mod: PBBFAC | Performed by: ADVANCED PRACTICE MIDWIFE

## 2025-05-21 NOTE — PROGRESS NOTES
Return OB Visit    34 y.o. female  at 30w1d   She denies any complaints.   Reports good fetal movement or fluttering. Denies any vaginal bleeding, leakage of fluid, cramping, contractions, or pressure.   Total weight gain/weight loss in pregnancy: 12.7 kg (28 lb)     Vitals  BP: 120/70  Pulse: 98  Weight: 92.1 kg (203 lb)  Prenatal  Fetal Heart Rate: 129  Movement: Present  Edema  LLE Edema: Mild pitting, slight indentation  RLE Edema: Mild pitting, slight indentation  Facial: None  Additional Edema?: No    US today EFW: 3lb 8oz MELI: 15.2cm Posterior placenta Vertex     Prenatal Labs:  Lab Results   Component Value Date    GROUPTRH A POS 2024    HGB 10.4 (L) 2025    HCT 32.7 (L) 2025     2025    HEPBSAG Non-Reactive 2024    [id number] Non-Reactive 2024    LABNGO Negative 2024    LABURIN Skin/Urogenital Juanita Isolated, no further workup. 2024       A: 30w1d           ICD-10-CM ICD-9-CM    1. Previous  section  Z98.891 V45.89       2. 30 weeks gestation of pregnancy  Z3A.30 V22.2 POCT URINALYSIS W/O SCOPE      3. Hx of preeclampsia, prior pregnancy, currently pregnant, third trimester  O09.293 V23.49       4. Urinary frequency  R35.0 788.41 POCT URINALYSIS W/O SCOPE      5. Obesity affecting pregnancy in third trimester, unspecified obesity type  O99.213 649.13       6. Anemia during pregnancy in third trimester  O99.013 648.23           The following were addressed during this visit:    29-32 Weeks  - Contraception/Tubal Consent   - Pre-registration   - Op note review/ consent   - Birth Plan   - Pediatrician   - Fetal Kick Counts/PIH/PTL precautions   - Preeclampsia Education   - Quiet time     -Benefits of breastfeeding   -Rooming In and Skin to Skin    P: Bleeding, daily fetal kick counts, and  labor/labor precautions discussed.    Questions answered to desired level of satisfaction  Verbalized understanding to all information and  instructions provided.  Follow up in about 2 weeks (around 6/4/2025) for OBV.    Karli Mckinney CNM, WHCLARA-BC

## 2025-06-04 ENCOUNTER — ROUTINE PRENATAL (OUTPATIENT)
Dept: OBSTETRICS AND GYNECOLOGY | Facility: CLINIC | Age: 35
End: 2025-06-04
Payer: COMMERCIAL

## 2025-06-04 VITALS
BODY MASS INDEX: 37.31 KG/M2 | SYSTOLIC BLOOD PRESSURE: 112 MMHG | DIASTOLIC BLOOD PRESSURE: 72 MMHG | WEIGHT: 204 LBS | HEART RATE: 85 BPM

## 2025-06-04 DIAGNOSIS — O99.013 ANEMIA DURING PREGNANCY IN THIRD TRIMESTER: ICD-10-CM

## 2025-06-04 DIAGNOSIS — R35.0 URINARY FREQUENCY: ICD-10-CM

## 2025-06-04 DIAGNOSIS — O09.293 HX OF PREECLAMPSIA, PRIOR PREGNANCY, CURRENTLY PREGNANT, THIRD TRIMESTER: ICD-10-CM

## 2025-06-04 DIAGNOSIS — Z98.891 PREVIOUS CESAREAN SECTION: Primary | ICD-10-CM

## 2025-06-04 DIAGNOSIS — Z3A.32 32 WEEKS GESTATION OF PREGNANCY: ICD-10-CM

## 2025-06-04 DIAGNOSIS — O99.213 OBESITY AFFECTING PREGNANCY IN THIRD TRIMESTER, UNSPECIFIED OBESITY TYPE: ICD-10-CM

## 2025-06-04 PROCEDURE — 99213 OFFICE O/P EST LOW 20 MIN: CPT | Mod: PBBFAC | Performed by: ADVANCED PRACTICE MIDWIFE

## 2025-06-04 PROCEDURE — 0502F SUBSEQUENT PRENATAL CARE: CPT | Mod: CPTII,,, | Performed by: ADVANCED PRACTICE MIDWIFE

## 2025-06-04 PROCEDURE — 99999 PR PBB SHADOW E&M-EST. PATIENT-LVL III: CPT | Mod: PBBFAC,,, | Performed by: ADVANCED PRACTICE MIDWIFE

## 2025-06-18 ENCOUNTER — ROUTINE PRENATAL (OUTPATIENT)
Dept: OBSTETRICS AND GYNECOLOGY | Facility: CLINIC | Age: 35
End: 2025-06-18
Payer: COMMERCIAL

## 2025-06-18 VITALS
BODY MASS INDEX: 37.86 KG/M2 | DIASTOLIC BLOOD PRESSURE: 72 MMHG | SYSTOLIC BLOOD PRESSURE: 130 MMHG | HEART RATE: 79 BPM | WEIGHT: 207 LBS

## 2025-06-18 DIAGNOSIS — O09.293 HX OF PREECLAMPSIA, PRIOR PREGNANCY, CURRENTLY PREGNANT, THIRD TRIMESTER: ICD-10-CM

## 2025-06-18 DIAGNOSIS — O99.213 OBESITY AFFECTING PREGNANCY IN THIRD TRIMESTER, UNSPECIFIED OBESITY TYPE: ICD-10-CM

## 2025-06-18 DIAGNOSIS — Z3A.34 34 WEEKS GESTATION OF PREGNANCY: ICD-10-CM

## 2025-06-18 DIAGNOSIS — R35.0 URINARY FREQUENCY: ICD-10-CM

## 2025-06-18 DIAGNOSIS — O36.8130 DECREASED FETAL MOVEMENTS IN THIRD TRIMESTER, SINGLE OR UNSPECIFIED FETUS: ICD-10-CM

## 2025-06-18 DIAGNOSIS — Z98.891 PREVIOUS CESAREAN SECTION: Primary | ICD-10-CM

## 2025-06-18 PROCEDURE — 0502F SUBSEQUENT PRENATAL CARE: CPT | Mod: CPTII,,, | Performed by: ADVANCED PRACTICE MIDWIFE

## 2025-06-18 PROCEDURE — 59025 FETAL NON-STRESS TEST: CPT | Mod: PBBFAC | Performed by: ADVANCED PRACTICE MIDWIFE

## 2025-06-18 PROCEDURE — 99213 OFFICE O/P EST LOW 20 MIN: CPT | Mod: PBBFAC | Performed by: ADVANCED PRACTICE MIDWIFE

## 2025-06-18 PROCEDURE — 99999 PR PBB SHADOW E&M-EST. PATIENT-LVL III: CPT | Mod: PBBFAC,,, | Performed by: ADVANCED PRACTICE MIDWIFE

## 2025-06-18 RX ORDER — NIFEDIPINE 10 MG/1
10 CAPSULE ORAL EVERY 6 HOURS
Qty: 56 CAPSULE | Refills: 0 | Status: SHIPPED | OUTPATIENT
Start: 2025-06-18 | End: 2025-07-02

## 2025-06-18 NOTE — PROCEDURES
Fetal non-stress test- Today    Date/Time: 6/18/2025 11:30 AM    Performed by: Karli Mckinney CNM  Authorized by: Karli Mckinney CNM    Nonstress Test:     Variability:  6-25 BPM    Decelerations:  None    Accelerations:  15 bpm    Acoustic Stimulator: No      Baseline:  145    Contractions:  Irregular    Contraction Frequency:  6-12min, 40-60sec  Biophysical Profile:     Nonstress Test Interpretation: reactive      Overall Impression:  Reassuring  Post-procedure:     Patient tolerance:  Patient tolerated the procedure well with no immediate complications

## 2025-06-18 NOTE — PROGRESS NOTES
"Return OB Visit    34 y.o. female  at 34w1d   She c/o occasional cramping. Reports feeling movements but they have "slowed down".  C/O more consistent contractions since yesterday.   Reports decreased fetal movement or fluttering. Denies any vaginal bleeding, leakage of fluid, contractions, or pressure.   Total weight gain/weight loss in pregnancy: 14.5 kg (32 lb)     Vitals  BP: 130/72  Pulse: 79  Weight: 93.9 kg (207 lb)  Prenatal  Fetal Heart Rate: 140s  Movement: Present  Edema  LLE Edema: Mild pitting, slight indentation  RLE Edema: Mild pitting, slight indentation  Facial: Mild pitting, slight indentation  Additional Edema?: No    Nst reactive, Cat 1 tracing.  Irregular Ucs noted on tracing    Prenatal Labs:  Lab Results   Component Value Date    GROUPTRH A POS 2024    HGB 10.4 (L) 2025    HCT 32.7 (L) 2025     2025    HEPBSAG Non-Reactive 2024    [id number] Non-Reactive 2024    LABNGO Negative 2024    LABURIN Skin/Urogenital Juanita Isolated, no further workup. 2024       A: 34w1d           ICD-10-CM ICD-9-CM    1. Previous  section  Z98.891 V45.89       2. 34 weeks gestation of pregnancy  Z3A.34 V22.2 POCT URINALYSIS W/O SCOPE      Fetal non-stress test- Today      3. Hx of preeclampsia, prior pregnancy, currently pregnant, third trimester  O09.293 V23.49 Fetal non-stress test- Today      4. Urinary frequency  R35.0 788.41 POCT URINALYSIS W/O SCOPE      5. Obesity affecting pregnancy in third trimester, unspecified obesity type  O99.213 649.13 Fetal non-stress test- Today      6. Decreased fetal movements in third trimester, single or unspecified fetus  O36.8130 655.73 Fetal non-stress test- Today          The following were addressed during this visit:    33-36 Weeks  - Childbirth Education/Hospital Tours   - Breastfeeding   - Fetal Kick Counts/PIH/PTL precautions     -Benefits of breastfeeding   -Rooming In and Skin to Skin    P: Bleeding, " daily fetal kick counts, and  labor/labor precautions discussed.  Procardia 10mg q6h x 2 weeks    Questions answered to desired level of satisfaction  Verbalized understanding to all information and instructions provided.  Follow up in about 1 week (around 2025) for OBV.    Karli Mckinney CNM, KATIA-BC

## 2025-06-25 ENCOUNTER — ROUTINE PRENATAL (OUTPATIENT)
Dept: OBSTETRICS AND GYNECOLOGY | Facility: CLINIC | Age: 35
End: 2025-06-25
Payer: COMMERCIAL

## 2025-06-25 VITALS
BODY MASS INDEX: 38.04 KG/M2 | SYSTOLIC BLOOD PRESSURE: 118 MMHG | WEIGHT: 208 LBS | DIASTOLIC BLOOD PRESSURE: 70 MMHG | HEART RATE: 99 BPM

## 2025-06-25 DIAGNOSIS — Z3A.35 35 WEEKS GESTATION OF PREGNANCY: ICD-10-CM

## 2025-06-25 DIAGNOSIS — Z03.79 ENCOUNTER FOR OTHER SUSPECTED MATERNAL AND FETAL CONDITIONS RULED OUT: ICD-10-CM

## 2025-06-25 DIAGNOSIS — Z98.891 PREVIOUS CESAREAN SECTION: Primary | ICD-10-CM

## 2025-06-25 DIAGNOSIS — O09.293 HX OF PREECLAMPSIA, PRIOR PREGNANCY, CURRENTLY PREGNANT, THIRD TRIMESTER: ICD-10-CM

## 2025-06-25 DIAGNOSIS — R35.0 URINARY FREQUENCY: ICD-10-CM

## 2025-06-25 PROCEDURE — 99213 OFFICE O/P EST LOW 20 MIN: CPT | Mod: PBBFAC | Performed by: ADVANCED PRACTICE MIDWIFE

## 2025-06-25 PROCEDURE — 0502F SUBSEQUENT PRENATAL CARE: CPT | Mod: CPTII,,, | Performed by: ADVANCED PRACTICE MIDWIFE

## 2025-06-25 PROCEDURE — 87653 STREP B DNA AMP PROBE: CPT | Mod: ,,, | Performed by: CLINICAL MEDICAL LABORATORY

## 2025-06-25 PROCEDURE — 99999 PR PBB SHADOW E&M-EST. PATIENT-LVL III: CPT | Mod: PBBFAC,,, | Performed by: ADVANCED PRACTICE MIDWIFE

## 2025-06-25 NOTE — PROGRESS NOTES
Return OB Visit    34 y.o. female  at 35w1d   She c/o irregular contractions, has improved with procardia.  Last dose at 7am today.  GBS collected.   Reports good fetal movement or fluttering. Denies any vaginal bleeding, leakage of fluid, cramping, contractions, or pressure.   Total weight gain/weight loss in pregnancy: 15 kg (33 lb)     Vitals  BP: 118/70  Pulse: 99  Weight: 94.3 kg (208 lb)  Prenatal  Fetal Heart Rate: 150s  Movement: Present  Edema  LLE Edema: Mild pitting, slight indentation  RLE Edema: Mild pitting, slight indentation  Facial: Mild pitting, slight indentation  Additional Edema?: No  Cervical Exam  Dilation: Fingertip (posterior)  Effacement (%): 20  Station: -3  Station (Labor Curve): 8 cm  Dilation/Effacement/Station  Dilation: Fingertip (posterior)  Effacement (%): 20  Station: -3    Prenatal Labs:  Lab Results   Component Value Date    GROUPTRH A POS 2024    HGB 10.4 (L) 2025    HCT 32.7 (L) 2025     2025    HEPBSAG Non-Reactive 2024    [id number] Non-Reactive 2024    LABNGO Negative 2024    LABURIN Skin/Urogenital Juanita Isolated, no further workup. 2024       A: 35w1d           ICD-10-CM ICD-9-CM    1. Previous  section  Z98.891 V45.89       2. 35 weeks gestation of pregnancy  Z3A.35 V22.2 POCT URINALYSIS W/O SCOPE      3. Hx of preeclampsia, prior pregnancy, currently pregnant, third trimester  O09.293 V23.49 US OB/GYN Procedure ([facility]) - Extended List - Future      4. Urinary frequency  R35.0 788.41 POCT URINALYSIS W/O SCOPE      5. Encounter for other suspected maternal and fetal conditions ruled out  Z03.79 V89.09 US OB/GYN Procedure ([facility]) - Extended List - Future          No pregnancy checklist tasks were completed during this visit, and no tasks are pending completion.    -Benefits of breastfeeding   -Rooming In and Skin to Skin    P: Bleeding, daily fetal kick counts, and  labor/labor precautions  discussed.    Questions answered to desired level of satisfaction  Verbalized understanding to all information and instructions provided.  Follow up in about 1 week (around 7/2/2025) for OBV, Ultrasound (growth, hx preeclampsia, obesity).    Karli Mckinney CNM, KATIA-BC

## 2025-06-26 LAB — GROUP B STREP, PCR: POSITIVE

## 2025-06-29 ENCOUNTER — HOSPITAL ENCOUNTER (EMERGENCY)
Facility: HOSPITAL | Age: 35
Discharge: HOME | End: 2025-06-29
Attending: OBSTETRICS & GYNECOLOGY | Admitting: OBSTETRICS & GYNECOLOGY
Payer: COMMERCIAL

## 2025-06-29 VITALS
BODY MASS INDEX: 38.21 KG/M2 | DIASTOLIC BLOOD PRESSURE: 59 MMHG | SYSTOLIC BLOOD PRESSURE: 111 MMHG | RESPIRATION RATE: 18 BRPM | HEIGHT: 62 IN | OXYGEN SATURATION: 98 % | WEIGHT: 207.63 LBS | TEMPERATURE: 98 F | HEART RATE: 96 BPM

## 2025-06-29 LAB
BILIRUB UR QL STRIP: NEGATIVE
CLARITY UR: CLEAR
COLOR UR: NORMAL
CTP QC/QA: YES
GLUCOSE UR STRIP-MCNC: NORMAL MG/DL
KETONES UR STRIP-SCNC: NEGATIVE MG/DL
LEUKOCYTE ESTERASE UR QL STRIP: NEGATIVE
NITRITE UR QL STRIP: NEGATIVE
PH UR STRIP: 5.5 PH UNITS
PROT UR QL STRIP: NEGATIVE
RBC # UR STRIP: NEGATIVE /UL
ROMPLUS TEST: NEGATIVE
SP GR UR STRIP: 1.02
UROBILINOGEN UR STRIP-ACNC: NORMAL MG/DL

## 2025-06-29 PROCEDURE — 96372 THER/PROPH/DIAG INJ SC/IM: CPT | Performed by: OBSTETRICS & GYNECOLOGY

## 2025-06-29 PROCEDURE — 81003 URINALYSIS AUTO W/O SCOPE: CPT | Performed by: OBSTETRICS & GYNECOLOGY

## 2025-06-29 PROCEDURE — 63600175 PHARM REV CODE 636 W HCPCS: Performed by: OBSTETRICS & GYNECOLOGY

## 2025-06-29 PROCEDURE — 99285 EMERGENCY DEPT VISIT HI MDM: CPT | Mod: 25

## 2025-06-29 PROCEDURE — 84112 EVAL AMNIOTIC FLUID PROTEIN: CPT

## 2025-06-29 RX ORDER — BETAMETHASONE SODIUM PHOSPHATE AND BETAMETHASONE ACETATE 3; 3 MG/ML; MG/ML
12 INJECTION, SUSPENSION INTRA-ARTICULAR; INTRALESIONAL; INTRAMUSCULAR; SOFT TISSUE ONCE
Status: COMPLETED | OUTPATIENT
Start: 2025-06-29 | End: 2025-06-29

## 2025-06-29 RX ORDER — TERBUTALINE SULFATE 1 MG/ML
0.25 INJECTION SUBCUTANEOUS ONCE
Status: COMPLETED | OUTPATIENT
Start: 2025-06-29 | End: 2025-06-29

## 2025-06-29 RX ADMIN — TERBUTALINE SULFATE 0.25 MG: 1 INJECTION, SOLUTION SUBCUTANEOUS at 08:06

## 2025-06-29 RX ADMIN — BETAMETHASONE SODIUM PHOSPHATE AND BETAMETHASONE ACETATE 12 MG: 3; 3 INJECTION, SUSPENSION INTRA-ARTICULAR; INTRALESIONAL; INTRAMUSCULAR at 08:06

## 2025-06-29 RX ADMIN — TERBUTALINE SULFATE 0.25 MG: 1 INJECTION, SOLUTION SUBCUTANEOUS at 06:06

## 2025-06-29 NOTE — ED PROVIDER NOTES
Encounter Date: 2025       History     Chief Complaint   Patient presents with    Contractions    Vaginal Discharge     Pt reports LOF since 1100 this am     HPI  34 yo  presents tonight at 35.5 complaining of contractions.  In the bathroom while here she thought she might have noted a small amount of fluid leak.  Denies VB.  Reports + FM.  Review of patient's allergies indicates:  No Known Allergies  Past Medical History:   Diagnosis Date    Migraine headache     Migraines      Past Surgical History:   Procedure Laterality Date     SECTION      INDUCED       TONSILLECTOMY       Family History   Problem Relation Name Age of Onset    Hypertension Father      Breast cancer Neg Hx      Colon cancer Neg Hx      Ovarian cancer Neg Hx       Social History[1]  Review of Systems    Physical Exam     Initial Vitals [25 1818]   BP Pulse Resp Temp SpO2   117/68 79 18 97.9 °F (36.6 °C) 98 %      MAP       --         Physical Exam  A&O X 3  CV RRR   Lungs CTAB  Abdomen soft, gravid, nontender  Cx: FT/th/high  Ext trace edema, no c/c  ED Course   Procedures  Labs Reviewed   URINALYSIS, REFLEX TO URINE CULTURE   POCT ROM PLUS          Imaging Results    None          Medications   terbutaline injection 0.25 mg (has no administration in time range)     Medical Decision Making  Amount and/or Complexity of Data Reviewed  Radiology: ordered.    Risk  Prescription drug management.             ROM plus is negative    UA negative    MELI: 10.5            CTXNs quiescant with two Terbs and continuation of Procardia.  Will go ahead and give BMZ course for FLM.                 Clinical Impression:   IUP@35.3, previous  X 2,  contractions.  Home to continue Procardia.  IM BMZ tonight, return tomorrow for #2.                        [1]   Social History  Tobacco Use    Smoking status: Never     Passive exposure: Never    Smokeless tobacco: Never   Substance Use Topics    Alcohol use: Not  Currently    Drug use: Tahmina Mooney, DO  06/29/25 6102

## 2025-06-30 ENCOUNTER — HOSPITAL ENCOUNTER (OUTPATIENT)
Facility: HOSPITAL | Age: 35
Discharge: HOME | End: 2025-06-30
Attending: STUDENT IN AN ORGANIZED HEALTH CARE EDUCATION/TRAINING PROGRAM | Admitting: STUDENT IN AN ORGANIZED HEALTH CARE EDUCATION/TRAINING PROGRAM
Payer: COMMERCIAL

## 2025-06-30 VITALS
TEMPERATURE: 97 F | DIASTOLIC BLOOD PRESSURE: 73 MMHG | RESPIRATION RATE: 16 BRPM | SYSTOLIC BLOOD PRESSURE: 119 MMHG | HEART RATE: 91 BPM

## 2025-06-30 PROCEDURE — 96372 THER/PROPH/DIAG INJ SC/IM: CPT

## 2025-06-30 PROCEDURE — 96372 THER/PROPH/DIAG INJ SC/IM: CPT | Performed by: STUDENT IN AN ORGANIZED HEALTH CARE EDUCATION/TRAINING PROGRAM

## 2025-06-30 PROCEDURE — 63600175 PHARM REV CODE 636 W HCPCS: Performed by: STUDENT IN AN ORGANIZED HEALTH CARE EDUCATION/TRAINING PROGRAM

## 2025-06-30 PROCEDURE — 99211 OFF/OP EST MAY X REQ PHY/QHP: CPT | Mod: 25

## 2025-06-30 RX ORDER — BETAMETHASONE SODIUM PHOSPHATE AND BETAMETHASONE ACETATE 3; 3 MG/ML; MG/ML
12 INJECTION, SUSPENSION INTRA-ARTICULAR; INTRALESIONAL; INTRAMUSCULAR; SOFT TISSUE ONCE
Status: COMPLETED | OUTPATIENT
Start: 2025-06-30 | End: 2025-06-30

## 2025-06-30 RX ADMIN — BETAMETHASONE SODIUM PHOSPHATE AND BETAMETHASONE ACETATE 12 MG: 3; 3 INJECTION, SUSPENSION INTRA-ARTICULAR; INTRALESIONAL; INTRAMUSCULAR at 06:06

## 2025-06-30 NOTE — ED NOTES
Received pt in KRYS bed 2, lying in bed in semi fowlers's position, hooked in CTG machine, FHT- 160bpm. V/S taken and recorded. No other additional complaints made.Monitored continuously.

## 2025-07-02 ENCOUNTER — ROUTINE PRENATAL (OUTPATIENT)
Dept: OBSTETRICS AND GYNECOLOGY | Facility: CLINIC | Age: 35
End: 2025-07-02
Payer: COMMERCIAL

## 2025-07-02 ENCOUNTER — HOSPITAL ENCOUNTER (OUTPATIENT)
Dept: OBSTETRICS AND GYNECOLOGY | Facility: CLINIC | Age: 35
Discharge: HOME | End: 2025-07-02
Payer: COMMERCIAL

## 2025-07-02 VITALS
SYSTOLIC BLOOD PRESSURE: 128 MMHG | HEART RATE: 100 BPM | DIASTOLIC BLOOD PRESSURE: 88 MMHG | WEIGHT: 206 LBS | BODY MASS INDEX: 37.68 KG/M2

## 2025-07-02 DIAGNOSIS — O09.293 HX OF PREECLAMPSIA, PRIOR PREGNANCY, CURRENTLY PREGNANT, THIRD TRIMESTER: ICD-10-CM

## 2025-07-02 DIAGNOSIS — Z3A.36 36 WEEKS GESTATION OF PREGNANCY: ICD-10-CM

## 2025-07-02 DIAGNOSIS — Z03.79 ENCOUNTER FOR OTHER SUSPECTED MATERNAL AND FETAL CONDITIONS RULED OUT: ICD-10-CM

## 2025-07-02 DIAGNOSIS — Z98.891 PREVIOUS CESAREAN SECTION: Primary | ICD-10-CM

## 2025-07-02 DIAGNOSIS — R35.0 URINARY FREQUENCY: ICD-10-CM

## 2025-07-02 PROCEDURE — 99999 PR PBB SHADOW E&M-EST. PATIENT-LVL III: CPT | Mod: PBBFAC,,, | Performed by: ADVANCED PRACTICE MIDWIFE

## 2025-07-02 PROCEDURE — 99213 OFFICE O/P EST LOW 20 MIN: CPT | Mod: PBBFAC | Performed by: ADVANCED PRACTICE MIDWIFE

## 2025-07-02 PROCEDURE — 0502F SUBSEQUENT PRENATAL CARE: CPT | Mod: CPTII,,, | Performed by: ADVANCED PRACTICE MIDWIFE

## 2025-07-02 NOTE — PROGRESS NOTES
Return OB Visit    35 y.o. female  at 36w1d   She c/o pressure and galo beth.   Reports good fetal movement or fluttering. Denies any vaginal bleeding, leakage of fluid, cramping.  Total weight gain/weight loss in pregnancy: 14.1 kg (31 lb)     Vitals  BP: 128/88  Pulse: 100  Weight: 93.4 kg (206 lb)  Prenatal  Fetal Heart Rate: 135  Movement: Present  Edema  LLE Edema: Mild pitting, slight indentation  RLE Edema: Mild pitting, slight indentation  Facial: Mild pitting, slight indentation  Additional Edema?: No  Cervical Exam  Dilation: Fingertip  Effacement (%): 50  Station: -3  Presentation: Vertex  Station (Labor Curve): 8 cm  Dilation/Effacement/Station  Dilation: Fingertip  Effacement (%): 50  Station: -3    US today EFW 6lb 10 oz MELI: 15.5cm Fundal Placenta Vertex     Prenatal Labs:  Lab Results   Component Value Date    GROUPTRH A POS 2024    HGB 10.4 (L) 2025    HCT 32.7 (L) 2025     2025    HEPBSAG Non-Reactive 2024    [id number] Non-Reactive 2024    LABNGO Negative 2024    LABURIN Skin/Urogenital Juanita Isolated, no further workup. 2024       A: 36w1d           ICD-10-CM ICD-9-CM    1. Previous  section  Z98.891 V45.89       2. 36 weeks gestation of pregnancy  Z3A.36 V22.2 POCT URINALYSIS W/O SCOPE      3. Hx of preeclampsia, prior pregnancy, currently pregnant, third trimester  O09.293 V23.49       4. Urinary frequency  R35.0 788.41 POCT URINALYSIS W/O SCOPE          No pregnancy checklist tasks were completed during this visit, and no tasks are pending completion.    -Benefits of breastfeeding   -Rooming In and Skin to Skin    P: Bleeding, daily fetal kick counts, and  labor/labor precautions discussed.    Questions answered to desired level of satisfaction  Verbalized understanding to all information and instructions provided.  Follow up in about 1 week (around 2025) for OBV.    Karli Mckinney CNM, KATIA-BC

## 2025-07-03 ENCOUNTER — ANESTHESIA EVENT (OUTPATIENT)
Dept: OBSTETRICS AND GYNECOLOGY | Facility: HOSPITAL | Age: 35
End: 2025-07-03
Payer: COMMERCIAL

## 2025-07-03 ENCOUNTER — PATIENT MESSAGE (OUTPATIENT)
Dept: OBSTETRICS AND GYNECOLOGY | Facility: HOSPITAL | Age: 35
End: 2025-07-03
Payer: COMMERCIAL

## 2025-07-03 ENCOUNTER — HOSPITAL ENCOUNTER (INPATIENT)
Facility: HOSPITAL | Age: 35
LOS: 3 days | Discharge: HOME | End: 2025-07-06
Attending: OBSTETRICS & GYNECOLOGY | Admitting: OBSTETRICS & GYNECOLOGY
Payer: COMMERCIAL

## 2025-07-03 ENCOUNTER — ANESTHESIA (OUTPATIENT)
Dept: OBSTETRICS AND GYNECOLOGY | Facility: HOSPITAL | Age: 35
End: 2025-07-03
Payer: COMMERCIAL

## 2025-07-03 DIAGNOSIS — O36.8390 NON-REASSURING ELECTRONIC FETAL MONITORING TRACING: ICD-10-CM

## 2025-07-03 DIAGNOSIS — O09.893 HISTORY OF PRETERM DELIVERY, CURRENTLY PREGNANT IN THIRD TRIMESTER: ICD-10-CM

## 2025-07-03 DIAGNOSIS — O36.8390 FETAL HEART RATE NON-REASSURING AFFECTING MANAGEMENT OF MOTHER: ICD-10-CM

## 2025-07-03 DIAGNOSIS — Z3A.36 36 WEEKS GESTATION OF PREGNANCY: ICD-10-CM

## 2025-07-03 DIAGNOSIS — O47.03 PRETERM UTERINE CONTRACTIONS IN THIRD TRIMESTER, ANTEPARTUM: ICD-10-CM

## 2025-07-03 DIAGNOSIS — Z34.90 PREGNANCY: ICD-10-CM

## 2025-07-03 DIAGNOSIS — O99.891 BACK PAIN COMPLICATING PREGNANCY, THIRD TRIMESTER: ICD-10-CM

## 2025-07-03 DIAGNOSIS — Z98.891 HISTORY OF 2 CESAREAN SECTIONS: ICD-10-CM

## 2025-07-03 DIAGNOSIS — M54.9 BACK PAIN COMPLICATING PREGNANCY, THIRD TRIMESTER: ICD-10-CM

## 2025-07-03 PROBLEM — Z30.2 REQUEST FOR STERILIZATION: Status: ACTIVE | Noted: 2025-07-03

## 2025-07-03 LAB
ALBUMIN SERPL BCP-MCNC: 2.9 G/DL (ref 3.5–5)
ALBUMIN/GLOB SERPL: 0.7 {RATIO}
ALP SERPL-CCNC: 125 U/L (ref 40–150)
ALT SERPL W P-5'-P-CCNC: 8 U/L
ANION GAP SERPL CALCULATED.3IONS-SCNC: 11 MMOL/L (ref 7–16)
AST SERPL W P-5'-P-CCNC: 15 U/L (ref 11–45)
BASOPHILS # BLD AUTO: 0.04 K/UL (ref 0–0.2)
BASOPHILS NFR BLD AUTO: 0.2 % (ref 0–1)
BILIRUB SERPL-MCNC: 0.5 MG/DL
BILIRUB UR QL STRIP: NEGATIVE
BUN SERPL-MCNC: 9 MG/DL (ref 7–19)
BUN/CREAT SERPL: 13 (ref 6–20)
CALCIUM SERPL-MCNC: 8.5 MG/DL (ref 8.4–10.2)
CHLORIDE SERPL-SCNC: 107 MMOL/L (ref 98–107)
CLARITY UR: CLEAR
CO2 SERPL-SCNC: 22 MMOL/L (ref 22–29)
COLOR UR: NORMAL
CREAT SERPL-MCNC: 0.7 MG/DL (ref 0.55–1.02)
DIFFERENTIAL METHOD BLD: ABNORMAL
EGFR (NO RACE VARIABLE) (RUSH/TITUS): 117 ML/MIN/1.73M2
EOSINOPHIL # BLD AUTO: 0.08 K/UL (ref 0–0.5)
EOSINOPHIL NFR BLD AUTO: 0.4 % (ref 1–4)
ERYTHROCYTE [DISTWIDTH] IN BLOOD BY AUTOMATED COUNT: 13.2 % (ref 11.5–14.5)
GLOBULIN SER-MCNC: 4.1 G/DL (ref 2–4)
GLUCOSE SERPL-MCNC: 88 MG/DL (ref 74–100)
GLUCOSE UR STRIP-MCNC: NORMAL MG/DL
HBV SURFACE AG SERPL QL IA: NORMAL
HCO3 UR-SCNC: 25.2 MMOL/L
HCO3 UR-SCNC: 26.9 MMOL/L (ref 18–23)
HCT VFR BLD AUTO: 32.2 % (ref 38–47)
HGB BLD-MCNC: 10.3 G/DL (ref 12–16)
HIV 1+O+2 AB SERPL QL: NORMAL
IMM GRANULOCYTES # BLD AUTO: 0.33 K/UL (ref 0–0.04)
IMM GRANULOCYTES NFR BLD: 1.8 % (ref 0–0.4)
INDIRECT COOMBS: NORMAL
KETONES UR STRIP-SCNC: NEGATIVE MG/DL
LEUKOCYTE ESTERASE UR QL STRIP: NEGATIVE
LYMPHOCYTES # BLD AUTO: 1.71 K/UL (ref 1–4.8)
LYMPHOCYTES NFR BLD AUTO: 9.1 % (ref 27–41)
MCH RBC QN AUTO: 27.7 PG (ref 27–31)
MCHC RBC AUTO-ENTMCNC: 32 G/DL (ref 32–36)
MCV RBC AUTO: 86.6 FL (ref 80–96)
MONOCYTES # BLD AUTO: 2.38 K/UL (ref 0–0.8)
MONOCYTES NFR BLD AUTO: 12.7 % (ref 2–6)
MPC BLD CALC-MCNC: 10.7 FL (ref 9.4–12.4)
NEUTROPHILS # BLD AUTO: 14.16 K/UL (ref 1.8–7.7)
NEUTROPHILS NFR BLD AUTO: 75.8 % (ref 53–65)
NITRITE UR QL STRIP: NEGATIVE
NRBC # BLD AUTO: 0.03 X10E3/UL
NRBC, AUTO (.00): 0.2 %
PCO2 BLDA: 66 MMHG (ref 41–51)
PCO2 BLDA: 72 MMHG
PH SMN: 7.18 [PH] (ref 7.35–7.45)
PH SMN: 7.19 [PH] (ref 7.32–7.42)
PH UR STRIP: 6 PH UNITS
PLATELET # BLD AUTO: 377 K/UL (ref 150–400)
PO2 BLDA: 23 MMHG
PO2 BLDA: 30 MMHG
POC A-ADO2: ABNORMAL MMHG
POC BASE EXCESS: -3.1 MMOL/L (ref -2–3)
POC BASE EXCESS: -4.3 MMOL/L (ref -2–2)
POC SATURATED O2: 31.6 % (ref 95–98)
POC SATURATED O2: 51.3 %
POTASSIUM SERPL-SCNC: 3.8 MMOL/L (ref 3.5–5.1)
PROT SERPL-MCNC: 7 G/DL (ref 6.4–8.3)
PROT UR QL STRIP: NEGATIVE
RBC # BLD AUTO: 3.72 M/UL (ref 4.2–5.4)
RBC # UR STRIP: NEGATIVE /UL
RH BLD: NORMAL
SODIUM SERPL-SCNC: 136 MMOL/L (ref 136–145)
SP GR UR STRIP: 1.02
SPECIMEN OUTDATE: NORMAL
SYPHILIS AB INTERPRETATION: NORMAL
UROBILINOGEN UR STRIP-ACNC: NORMAL MG/DL
WBC # BLD AUTO: 18.7 K/UL (ref 4.5–11)

## 2025-07-03 PROCEDURE — 27201423 OPTIME MED/SURG SUP & DEVICES STERILE SUPPLY: Performed by: OBSTETRICS & GYNECOLOGY

## 2025-07-03 PROCEDURE — 71000039 HC RECOVERY, EACH ADD'L HOUR: Performed by: OBSTETRICS & GYNECOLOGY

## 2025-07-03 PROCEDURE — 25000003 PHARM REV CODE 250: Performed by: ANESTHESIOLOGY

## 2025-07-03 PROCEDURE — 4A0HXCZ MEASUREMENT OF PRODUCTS OF CONCEPTION, CARDIAC RATE, EXTERNAL APPROACH: ICD-10-PCS | Performed by: OBSTETRICS & GYNECOLOGY

## 2025-07-03 PROCEDURE — 96375 TX/PRO/DX INJ NEW DRUG ADDON: CPT

## 2025-07-03 PROCEDURE — 82803 BLOOD GASES ANY COMBINATION: CPT

## 2025-07-03 PROCEDURE — 86850 RBC ANTIBODY SCREEN: CPT | Performed by: OBSTETRICS & GYNECOLOGY

## 2025-07-03 PROCEDURE — 81003 URINALYSIS AUTO W/O SCOPE: CPT | Performed by: OBSTETRICS & GYNECOLOGY

## 2025-07-03 PROCEDURE — 59510 CESAREAN DELIVERY: CPT | Mod: ,,, | Performed by: OBSTETRICS & GYNECOLOGY

## 2025-07-03 PROCEDURE — 37000009 HC ANESTHESIA EA ADD 15 MINS: Performed by: OBSTETRICS & GYNECOLOGY

## 2025-07-03 PROCEDURE — 80053 COMPREHEN METABOLIC PANEL: CPT | Performed by: OBSTETRICS & GYNECOLOGY

## 2025-07-03 PROCEDURE — 71000033 HC RECOVERY, INTIAL HOUR: Performed by: OBSTETRICS & GYNECOLOGY

## 2025-07-03 PROCEDURE — 11000001 HC ACUTE MED/SURG PRIVATE ROOM

## 2025-07-03 PROCEDURE — 88307 TISSUE EXAM BY PATHOLOGIST: CPT | Mod: 26,,, | Performed by: PATHOLOGY

## 2025-07-03 PROCEDURE — 99285 EMERGENCY DEPT VISIT HI MDM: CPT | Mod: 25

## 2025-07-03 PROCEDURE — 36004724 HC OB OR TIME LEV III - 1ST 15 MIN: Performed by: OBSTETRICS & GYNECOLOGY

## 2025-07-03 PROCEDURE — 63600175 PHARM REV CODE 636 W HCPCS: Performed by: OBSTETRICS & GYNECOLOGY

## 2025-07-03 PROCEDURE — 36415 COLL VENOUS BLD VENIPUNCTURE: CPT | Performed by: OBSTETRICS & GYNECOLOGY

## 2025-07-03 PROCEDURE — 87340 HEPATITIS B SURFACE AG IA: CPT | Performed by: OBSTETRICS & GYNECOLOGY

## 2025-07-03 PROCEDURE — 0UT70ZZ RESECTION OF BILATERAL FALLOPIAN TUBES, OPEN APPROACH: ICD-10-PCS | Performed by: OBSTETRICS & GYNECOLOGY

## 2025-07-03 PROCEDURE — 36004725 HC OB OR TIME LEV III - EA ADD 15 MIN: Performed by: OBSTETRICS & GYNECOLOGY

## 2025-07-03 PROCEDURE — 37000008 HC ANESTHESIA 1ST 15 MINUTES: Performed by: OBSTETRICS & GYNECOLOGY

## 2025-07-03 PROCEDURE — D9220A PRA ANESTHESIA: Mod: CRNA,,, | Performed by: NURSE ANESTHETIST, CERTIFIED REGISTERED

## 2025-07-03 PROCEDURE — 88302 TISSUE EXAM BY PATHOLOGIST: CPT | Mod: TC,SUR | Performed by: OBSTETRICS & GYNECOLOGY

## 2025-07-03 PROCEDURE — 25000003 PHARM REV CODE 250: Performed by: OBSTETRICS & GYNECOLOGY

## 2025-07-03 PROCEDURE — 96374 THER/PROPH/DIAG INJ IV PUSH: CPT

## 2025-07-03 PROCEDURE — 87389 HIV-1 AG W/HIV-1&-2 AB AG IA: CPT | Performed by: OBSTETRICS & GYNECOLOGY

## 2025-07-03 PROCEDURE — 58611 LIGATE OVIDUCT(S) ADD-ON: CPT | Mod: ,,, | Performed by: OBSTETRICS & GYNECOLOGY

## 2025-07-03 PROCEDURE — 86780 TREPONEMA PALLIDUM: CPT | Performed by: OBSTETRICS & GYNECOLOGY

## 2025-07-03 PROCEDURE — 88302 TISSUE EXAM BY PATHOLOGIST: CPT | Mod: 26,,, | Performed by: PATHOLOGY

## 2025-07-03 PROCEDURE — 63600175 PHARM REV CODE 636 W HCPCS: Performed by: ANESTHESIOLOGY

## 2025-07-03 PROCEDURE — D9220A PRA ANESTHESIA: Mod: ANES,,, | Performed by: ANESTHESIOLOGY

## 2025-07-03 PROCEDURE — 59025 FETAL NON-STRESS TEST: CPT

## 2025-07-03 PROCEDURE — 85025 COMPLETE CBC W/AUTO DIFF WBC: CPT | Performed by: OBSTETRICS & GYNECOLOGY

## 2025-07-03 RX ORDER — CYCLOBENZAPRINE HCL 10 MG
10 TABLET ORAL ONCE
Status: COMPLETED | OUTPATIENT
Start: 2025-07-03 | End: 2025-07-03

## 2025-07-03 RX ORDER — PRENATAL WITH FERROUS FUM AND FOLIC ACID 3080; 920; 120; 400; 22; 1.84; 3; 20; 10; 1; 12; 200; 27; 25; 2 [IU]/1; [IU]/1; MG/1; [IU]/1; MG/1; MG/1; MG/1; MG/1; MG/1; MG/1; UG/1; MG/1; MG/1; MG/1; MG/1
1 TABLET ORAL DAILY
Status: DISCONTINUED | OUTPATIENT
Start: 2025-07-04 | End: 2025-07-06 | Stop reason: HOSPADM

## 2025-07-03 RX ORDER — SIMETHICONE 80 MG
1 TABLET,CHEWABLE ORAL 4 TIMES DAILY PRN
Status: DISCONTINUED | OUTPATIENT
Start: 2025-07-03 | End: 2025-07-04

## 2025-07-03 RX ORDER — FAMOTIDINE 10 MG/ML
INJECTION, SOLUTION INTRAVENOUS
Status: COMPLETED
Start: 2025-07-03 | End: 2025-07-03

## 2025-07-03 RX ORDER — CEFAZOLIN 2 G/1
2 INJECTION, POWDER, FOR SOLUTION INTRAMUSCULAR; INTRAVENOUS
Status: COMPLETED | OUTPATIENT
Start: 2025-07-04 | End: 2025-07-04

## 2025-07-03 RX ORDER — ETOMIDATE 2 MG/ML
INJECTION INTRAVENOUS
Status: DISCONTINUED | OUTPATIENT
Start: 2025-07-03 | End: 2025-07-03

## 2025-07-03 RX ORDER — OXYTOCIN/0.9 % SODIUM CHLORIDE 15/250 ML
95 PLASTIC BAG, INJECTION (ML) INTRAVENOUS CONTINUOUS PRN
Status: DISCONTINUED | OUTPATIENT
Start: 2025-07-03 | End: 2025-07-04

## 2025-07-03 RX ORDER — TRANEXAMIC ACID 10 MG/ML
1000 INJECTION, SOLUTION INTRAVENOUS EVERY 30 MIN PRN
Status: DISCONTINUED | OUTPATIENT
Start: 2025-07-03 | End: 2025-07-04

## 2025-07-03 RX ORDER — LIDOCAINE HYDROCHLORIDE 20 MG/ML
INJECTION, SOLUTION EPIDURAL; INFILTRATION; INTRACAUDAL; PERINEURAL
Status: DISCONTINUED | OUTPATIENT
Start: 2025-07-03 | End: 2025-07-03

## 2025-07-03 RX ORDER — DIPHENHYDRAMINE HCL 25 MG
25 CAPSULE ORAL EVERY 4 HOURS PRN
Status: DISCONTINUED | OUTPATIENT
Start: 2025-07-03 | End: 2025-07-06 | Stop reason: HOSPADM

## 2025-07-03 RX ORDER — DOCUSATE SODIUM 100 MG/1
200 CAPSULE, LIQUID FILLED ORAL 2 TIMES DAILY
Status: DISCONTINUED | OUTPATIENT
Start: 2025-07-03 | End: 2025-07-04

## 2025-07-03 RX ORDER — PROCHLORPERAZINE EDISYLATE 5 MG/ML
5 INJECTION INTRAMUSCULAR; INTRAVENOUS EVERY 6 HOURS PRN
Status: DISCONTINUED | OUTPATIENT
Start: 2025-07-03 | End: 2025-07-06 | Stop reason: HOSPADM

## 2025-07-03 RX ORDER — CEFAZOLIN 2 G/1
INJECTION, POWDER, FOR SOLUTION INTRAMUSCULAR; INTRAVENOUS
Status: COMPLETED
Start: 2025-07-03 | End: 2025-07-03

## 2025-07-03 RX ORDER — MORPHINE SULFATE 8 MG/ML
INJECTION INTRAMUSCULAR; INTRAVENOUS; SUBCUTANEOUS
Status: DISCONTINUED | OUTPATIENT
Start: 2025-07-03 | End: 2025-07-03

## 2025-07-03 RX ORDER — HYDROCODONE BITARTRATE AND ACETAMINOPHEN 5; 325 MG/1; MG/1
1 TABLET ORAL EVERY 4 HOURS PRN
Status: DISCONTINUED | OUTPATIENT
Start: 2025-07-03 | End: 2025-07-04

## 2025-07-03 RX ORDER — AMOXICILLIN 250 MG
1 CAPSULE ORAL NIGHTLY PRN
Status: DISCONTINUED | OUTPATIENT
Start: 2025-07-03 | End: 2025-07-06 | Stop reason: HOSPADM

## 2025-07-03 RX ORDER — CEFAZOLIN 2 G/1
INJECTION, POWDER, FOR SOLUTION INTRAMUSCULAR; INTRAVENOUS
Status: DISCONTINUED
Start: 2025-07-03 | End: 2025-07-03 | Stop reason: WASHOUT

## 2025-07-03 RX ORDER — OXYTOCIN/0.9 % SODIUM CHLORIDE 15/250 ML
95 PLASTIC BAG, INJECTION (ML) INTRAVENOUS ONCE AS NEEDED
Status: DISCONTINUED | OUTPATIENT
Start: 2025-07-03 | End: 2025-07-04

## 2025-07-03 RX ORDER — SODIUM CHLORIDE 0.9 % (FLUSH) 0.9 %
10 SYRINGE (ML) INJECTION
Status: DISCONTINUED | OUTPATIENT
Start: 2025-07-03 | End: 2025-07-06 | Stop reason: HOSPADM

## 2025-07-03 RX ORDER — ROCURONIUM BROMIDE 10 MG/ML
INJECTION, SOLUTION INTRAVENOUS
Status: DISCONTINUED | OUTPATIENT
Start: 2025-07-03 | End: 2025-07-03

## 2025-07-03 RX ORDER — ONDANSETRON HYDROCHLORIDE 2 MG/ML
INJECTION, SOLUTION INTRAVENOUS
Status: DISCONTINUED | OUTPATIENT
Start: 2025-07-03 | End: 2025-07-03

## 2025-07-03 RX ORDER — OXYTOCIN-SODIUM CHLORIDE 0.9% IV SOLN 30 UNIT/500ML 30-0.9/5 UT/ML-%
10 SOLUTION INTRAVENOUS ONCE AS NEEDED
Status: DISCONTINUED | OUTPATIENT
Start: 2025-07-03 | End: 2025-07-04

## 2025-07-03 RX ORDER — KETOROLAC TROMETHAMINE 30 MG/ML
30 INJECTION, SOLUTION INTRAMUSCULAR; INTRAVENOUS EVERY 6 HOURS
Status: COMPLETED | OUTPATIENT
Start: 2025-07-04 | End: 2025-07-04

## 2025-07-03 RX ORDER — ONDANSETRON 4 MG/1
8 TABLET, ORALLY DISINTEGRATING ORAL EVERY 8 HOURS PRN
Status: DISCONTINUED | OUTPATIENT
Start: 2025-07-03 | End: 2025-07-06 | Stop reason: HOSPADM

## 2025-07-03 RX ORDER — CARBOPROST TROMETHAMINE 250 UG/ML
250 INJECTION, SOLUTION INTRAMUSCULAR
Status: DISCONTINUED | OUTPATIENT
Start: 2025-07-03 | End: 2025-07-04

## 2025-07-03 RX ORDER — IBUPROFEN 800 MG/1
800 TABLET, FILM COATED ORAL EVERY 8 HOURS PRN
Status: DISCONTINUED | OUTPATIENT
Start: 2025-07-04 | End: 2025-07-06 | Stop reason: HOSPADM

## 2025-07-03 RX ORDER — HYDROMORPHONE HCL IN 0.9% NACL 6 MG/30 ML
PATIENT CONTROLLED ANALGESIA SYRINGE INTRAVENOUS CONTINUOUS
Refills: 0 | Status: DISCONTINUED | OUTPATIENT
Start: 2025-07-03 | End: 2025-07-04

## 2025-07-03 RX ORDER — ADHESIVE BANDAGE
30 BANDAGE TOPICAL 2 TIMES DAILY PRN
Status: DISCONTINUED | OUTPATIENT
Start: 2025-07-04 | End: 2025-07-06 | Stop reason: HOSPADM

## 2025-07-03 RX ORDER — MUPIROCIN 20 MG/G
OINTMENT TOPICAL
Status: DISCONTINUED | OUTPATIENT
Start: 2025-07-03 | End: 2025-07-04

## 2025-07-03 RX ORDER — MISOPROSTOL 200 UG/1
800 TABLET ORAL ONCE AS NEEDED
Status: DISCONTINUED | OUTPATIENT
Start: 2025-07-03 | End: 2025-07-06 | Stop reason: HOSPADM

## 2025-07-03 RX ORDER — CEFAZOLIN 2 G/1
2 INJECTION, POWDER, FOR SOLUTION INTRAMUSCULAR; INTRAVENOUS
Status: COMPLETED | OUTPATIENT
Start: 2025-07-03 | End: 2025-07-03

## 2025-07-03 RX ORDER — NALOXONE HCL 0.4 MG/ML
0.02 VIAL (ML) INJECTION
Status: DISCONTINUED | OUTPATIENT
Start: 2025-07-03 | End: 2025-07-06 | Stop reason: HOSPADM

## 2025-07-03 RX ORDER — METHYLERGONOVINE MALEATE 0.2 MG/ML
200 INJECTION INTRAVENOUS ONCE AS NEEDED
Status: DISCONTINUED | OUTPATIENT
Start: 2025-07-03 | End: 2025-07-06 | Stop reason: HOSPADM

## 2025-07-03 RX ORDER — METHYLERGONOVINE MALEATE 0.2 MG/ML
200 INJECTION INTRAVENOUS ONCE AS NEEDED
Status: DISCONTINUED | OUTPATIENT
Start: 2025-07-03 | End: 2025-07-04

## 2025-07-03 RX ORDER — OXYTOCIN 10 [USP'U]/ML
10 INJECTION, SOLUTION INTRAMUSCULAR; INTRAVENOUS ONCE AS NEEDED
Status: DISCONTINUED | OUTPATIENT
Start: 2025-07-03 | End: 2025-07-06 | Stop reason: HOSPADM

## 2025-07-03 RX ORDER — DIPHENOXYLATE HYDROCHLORIDE AND ATROPINE SULFATE 2.5; .025 MG/1; MG/1
2 TABLET ORAL EVERY 6 HOURS PRN
Status: DISCONTINUED | OUTPATIENT
Start: 2025-07-03 | End: 2025-07-06 | Stop reason: HOSPADM

## 2025-07-03 RX ORDER — ONDANSETRON HYDROCHLORIDE 2 MG/ML
4 INJECTION, SOLUTION INTRAVENOUS EVERY 12 HOURS PRN
Status: DISCONTINUED | OUTPATIENT
Start: 2025-07-03 | End: 2025-07-06 | Stop reason: HOSPADM

## 2025-07-03 RX ORDER — SIMETHICONE 80 MG
1 TABLET,CHEWABLE ORAL EVERY 6 HOURS PRN
Status: DISCONTINUED | OUTPATIENT
Start: 2025-07-03 | End: 2025-07-06 | Stop reason: HOSPADM

## 2025-07-03 RX ORDER — CALCIUM CARBONATE 200(500)MG
500 TABLET,CHEWABLE ORAL 3 TIMES DAILY PRN
Status: DISCONTINUED | OUTPATIENT
Start: 2025-07-03 | End: 2025-07-06 | Stop reason: HOSPADM

## 2025-07-03 RX ORDER — OXYCODONE AND ACETAMINOPHEN 10; 325 MG/1; MG/1
1 TABLET ORAL EVERY 4 HOURS PRN
Status: DISCONTINUED | OUTPATIENT
Start: 2025-07-03 | End: 2025-07-06 | Stop reason: HOSPADM

## 2025-07-03 RX ORDER — KETOROLAC TROMETHAMINE 30 MG/ML
INJECTION, SOLUTION INTRAMUSCULAR; INTRAVENOUS
Status: DISCONTINUED | OUTPATIENT
Start: 2025-07-03 | End: 2025-07-03

## 2025-07-03 RX ORDER — TRANEXAMIC ACID 10 MG/ML
1000 INJECTION, SOLUTION INTRAVENOUS EVERY 30 MIN PRN
Status: DISCONTINUED | OUTPATIENT
Start: 2025-07-03 | End: 2025-07-06 | Stop reason: HOSPADM

## 2025-07-03 RX ORDER — SODIUM CITRATE AND CITRIC ACID MONOHYDRATE 334; 500 MG/5ML; MG/5ML
30 SOLUTION ORAL ONCE
Status: COMPLETED | OUTPATIENT
Start: 2025-07-03 | End: 2025-07-03

## 2025-07-03 RX ORDER — CARBOPROST TROMETHAMINE 250 UG/ML
250 INJECTION, SOLUTION INTRAMUSCULAR
Status: DISCONTINUED | OUTPATIENT
Start: 2025-07-03 | End: 2025-07-06 | Stop reason: HOSPADM

## 2025-07-03 RX ORDER — FAMOTIDINE 10 MG/ML
20 INJECTION, SOLUTION INTRAVENOUS 2 TIMES DAILY
Status: DISCONTINUED | OUTPATIENT
Start: 2025-07-03 | End: 2025-07-04

## 2025-07-03 RX ORDER — HYDROCODONE BITARTRATE AND ACETAMINOPHEN 7.5; 325 MG/1; MG/1
1 TABLET ORAL ONCE
Refills: 0 | Status: COMPLETED | OUTPATIENT
Start: 2025-07-03 | End: 2025-07-03

## 2025-07-03 RX ORDER — BISACODYL 10 MG/1
10 SUPPOSITORY RECTAL ONCE AS NEEDED
Status: DISCONTINUED | OUTPATIENT
Start: 2025-07-03 | End: 2025-07-06 | Stop reason: HOSPADM

## 2025-07-03 RX ORDER — SODIUM CHLORIDE, SODIUM LACTATE, POTASSIUM CHLORIDE, CALCIUM CHLORIDE 600; 310; 30; 20 MG/100ML; MG/100ML; MG/100ML; MG/100ML
INJECTION, SOLUTION INTRAVENOUS CONTINUOUS
Status: DISCONTINUED | OUTPATIENT
Start: 2025-07-03 | End: 2025-07-04

## 2025-07-03 RX ORDER — SODIUM CITRATE AND CITRIC ACID MONOHYDRATE 334; 500 MG/5ML; MG/5ML
SOLUTION ORAL
Status: COMPLETED
Start: 2025-07-03 | End: 2025-07-03

## 2025-07-03 RX ORDER — OXYTOCIN 10 [USP'U]/ML
10 INJECTION, SOLUTION INTRAMUSCULAR; INTRAVENOUS ONCE AS NEEDED
Status: DISCONTINUED | OUTPATIENT
Start: 2025-07-03 | End: 2025-07-04

## 2025-07-03 RX ORDER — LIDOCAINE HYDROCHLORIDE 10 MG/ML
10 INJECTION, SOLUTION INFILTRATION; PERINEURAL ONCE AS NEEDED
Status: DISCONTINUED | OUTPATIENT
Start: 2025-07-03 | End: 2025-07-04

## 2025-07-03 RX ORDER — DIPHENOXYLATE HYDROCHLORIDE AND ATROPINE SULFATE 2.5; .025 MG/1; MG/1
2 TABLET ORAL EVERY 6 HOURS PRN
Status: DISCONTINUED | OUTPATIENT
Start: 2025-07-03 | End: 2025-07-04

## 2025-07-03 RX ADMIN — SODIUM CITRATE AND CITRIC ACID MONOHYDRATE 30 ML: 500; 334 SOLUTION ORAL at 08:07

## 2025-07-03 RX ADMIN — Medication: at 09:07

## 2025-07-03 RX ADMIN — ONDANSETRON 4 MG: 2 INJECTION INTRAMUSCULAR; INTRAVENOUS at 08:07

## 2025-07-03 RX ADMIN — CEFAZOLIN 2 G: 2 INJECTION, POWDER, FOR SOLUTION INTRAMUSCULAR; INTRAVENOUS at 08:07

## 2025-07-03 RX ADMIN — ONDANSETRON 4 MG: 2 INJECTION INTRAMUSCULAR; INTRAVENOUS at 09:07

## 2025-07-03 RX ADMIN — SUGAMMADEX 200 MG: 100 INJECTION, SOLUTION INTRAVENOUS at 09:07

## 2025-07-03 RX ADMIN — Medication 6 MILLI-UNITS/MIN: at 08:07

## 2025-07-03 RX ADMIN — MORPHINE SULFATE 8 MG: 8 INJECTION INTRAVENOUS at 09:07

## 2025-07-03 RX ADMIN — ETOMIDATE 20 MG: 2 INJECTION INTRAVENOUS at 08:07

## 2025-07-03 RX ADMIN — LIDOCAINE HYDROCHLORIDE 100 MG: 20 INJECTION, SOLUTION INTRAVENOUS at 08:07

## 2025-07-03 RX ADMIN — AZITHROMYCIN MONOHYDRATE 500 MG: 500 INJECTION, POWDER, LYOPHILIZED, FOR SOLUTION INTRAVENOUS at 09:07

## 2025-07-03 RX ADMIN — ROCURONIUM BROMIDE 50 MG: 10 INJECTION, SOLUTION INTRAVENOUS at 08:07

## 2025-07-03 RX ADMIN — FAMOTIDINE 20 MG: 10 INJECTION, SOLUTION INTRAVENOUS at 08:07

## 2025-07-03 RX ADMIN — HYDROCODONE BITARTRATE AND ACETAMINOPHEN 1 TABLET: 7.5; 325 TABLET ORAL at 08:07

## 2025-07-03 RX ADMIN — KETOROLAC TROMETHAMINE 30 MG: 30 INJECTION, SOLUTION INTRAMUSCULAR; INTRAVENOUS at 09:07

## 2025-07-03 RX ADMIN — CYCLOBENZAPRINE 10 MG: 10 TABLET, FILM COATED ORAL at 06:07

## 2025-07-03 NOTE — ED PROVIDER NOTES
Encounter Date: 7/3/2025       History     Chief Complaint   Patient presents with    Abdominal Pain     36 yo  c IUP @ 36+2 weeks presents to the OB ED c/o contractions increasing today and back pain in her left lower side. She was on Procardia for  contractions. This was stopped yesterday at 36 weeks. She does have a h/o delivery at 36 weeks x 2 due to  labor. She has a h/o C/S x 2.   She denies any VB or LOF. Good FM.  She denies any n/v. She feels like she is well hydrated.   Denies any dysuria or changes in Bms.  Describes the back pain as a tight muscle pain with sharp intermittent pains.  Due to the pain she did not sleep well last night.    The history is provided by the patient.     Review of patient's allergies indicates:  No Known Allergies  Past Medical History:   Diagnosis Date    Migraine headache     Migraines      Past Surgical History:   Procedure Laterality Date     SECTION      INDUCED       TONSILLECTOMY       Family History   Problem Relation Name Age of Onset    Hypertension Father      Breast cancer Neg Hx      Colon cancer Neg Hx      Ovarian cancer Neg Hx       Social History[1]  Review of Systems   Constitutional:  Positive for fatigue. Negative for fever.   Respiratory:  Negative for shortness of breath.    Cardiovascular:  Negative for chest pain.   Gastrointestinal:  Positive for abdominal pain (contractions). Negative for nausea and vomiting.   Genitourinary:  Negative for dysuria.   Musculoskeletal:  Positive for back pain.       Physical Exam     Initial Vitals [25 1840]   BP Pulse Resp Temp SpO2   118/72 104 20 98.1 °F (36.7 °C) 100 %      MAP       --         Physical Exam    Vitals reviewed.  Constitutional: She appears well-developed and well-nourished. She appears distressed (mild distress from pain and not able to get very comfortable).   HENT:   Head: Normocephalic and atraumatic.   Cardiovascular:  Normal rate.            Pulmonary/Chest:   Unlabored breathing   Abdominal: Abdomen is soft. There is no abdominal tenderness.   Left lumbar paraspinous muscle tenderness and tightness. No spinal tenderness.     Neurological: She is alert and oriented to person, place, and time.   Skin: Skin is warm and dry.   Psychiatric: She has a normal mood and affect. Thought content normal.         ED Course   Fetal non-stress test    Date/Time: 7/3/2025 7:16 PM    Performed by: Con Taylor MD  Authorized by: Con Taylor MD    Nonstress Test:     Variability:  6-25 BPM    Accelerations:  15 bpm    Baseline:  150    Uterine Irritability: Yes      Contractions:  Regular    Contraction Frequency:  8-10 minutes  Biophysical Profile:     Nonstress Test Interpretation: reactive      Overall Impression:  Reassuring  Post-procedure:     Patient tolerance:  Patient tolerated the procedure well with no immediate complications     At 1852 there was a 2 minute period of marked variability and/or variable deceleration. The it went back to baseline.    Labs Reviewed   URINALYSIS, REFLEX TO URINE CULTURE       Result Value    Color, UA Light Yellow      Clarity, UA Clear      pH, UA 6.0      Leukocytes, UA Negative      Nitrites, UA Negative      Protein, UA Negative      Glucose, UA Normal      Ketones, UA Negative      Urobilinogen, UA Normal      Bilirubin, UA Negative      Blood, UA Negative      Specific Gravity, UA 1.017            Imaging Results    None          Medications   cyclobenzaprine tablet 10 mg (10 mg Oral Given 7/3/25 184)     Medical Decision Making  34 yo  c IUP @ 36+2 weeks presents to the OB ED c/o contractions increasing today and back pain in her left lower side. She was on Procardia for  contractions. This was stopped yesterday at 36 weeks. She does have a h/o delivery at 36 weeks x 2 due to  labor. She has a h/o C/S x 2.   She denies any VB or LOF. Good FM.  She denies any n/v. She feels like she is well  hydrated.   Denies any dysuria or changes in Bms.  Describes the back pain as a tight muscle pain with sharp intermittent pains.  Due to the pain she did not sleep well last night.    Flexeril 10 mg x 1 po given for back pain.   There was a 2 minute period at 1852 that appeared to be marked variability and/or variable deceleration x 2 minutes. BPP and MELI ordered. This did not have time to be performed due to need for C/S.     Will recheck SVE in 1 hour.   Once US and SVE performed, will re-evaluate patient and discuss further plan of care.  Repeat SVE was unchanged.   However, the FHTs started to have a prolonged deceleration to the 80s that did not improve with position change. Therefore, the decision was made to proceed with STAT C/S. O2 applied. IVF bolus started. FHTs recovered.   Patient was taken to the OR for RLTCS. She also requested permanent sterilization via a bilateral salpingectomy.   R/B/A discussed. All questions answered. Consents reviewed and signed.       Risk  Prescription drug management.                                      Clinical Impression:  Final diagnoses:  [O99.891, M54.9] Back pain complicating pregnancy, third trimester (Primary)  [O47.03]  uterine contractions in third trimester, antepartum  [O09.893] History of  delivery, currently pregnant in third trimester  [Z98.891] History of 2  sections  [Z3A.36] 36 weeks gestation of pregnancy                       [1]   Social History  Tobacco Use    Smoking status: Never     Passive exposure: Never    Smokeless tobacco: Never   Substance Use Topics    Alcohol use: Not Currently    Drug use: Never

## 2025-07-03 NOTE — ED NOTES
Notified Dr. Taylor of pt arrival, complaint and status. Informed Dr. Taylor pt fingertip/ 50/-2 without vaginal bleeding or leakage. FHTs reassuring at this time. Order recd for PO hydration and flexeril 10 mg PO now.

## 2025-07-03 NOTE — ED NOTES
Pt recd ambulatory to [facility] 3 for co irregular cramping and left lower back pain. Pt states has taken a warm bath today and tylenol but nothing has eased her back pain. Pt denies vaginal bleeding or leakage. Pt states + fetal movement. Pt denies n/v. SO is supportive at bs. Pt states is ok to perform SVE at this time. Pt states was checked by AZEB Mckinney CNM yesterday and was fingertip dilated.

## 2025-07-04 LAB
BASOPHILS # BLD AUTO: 0.04 K/UL (ref 0–0.2)
BASOPHILS NFR BLD AUTO: 0.2 % (ref 0–1)
DIFFERENTIAL METHOD BLD: ABNORMAL
EOSINOPHIL # BLD AUTO: 0.06 K/UL (ref 0–0.5)
EOSINOPHIL NFR BLD AUTO: 0.3 % (ref 1–4)
ERYTHROCYTE [DISTWIDTH] IN BLOOD BY AUTOMATED COUNT: 13.2 % (ref 11.5–14.5)
HCT VFR BLD AUTO: 28.2 % (ref 38–47)
HGB BLD-MCNC: 9.1 G/DL (ref 12–16)
IMM GRANULOCYTES # BLD AUTO: 0.21 K/UL (ref 0–0.04)
IMM GRANULOCYTES NFR BLD: 1.1 % (ref 0–0.4)
LYMPHOCYTES # BLD AUTO: 2.25 K/UL (ref 1–4.8)
LYMPHOCYTES NFR BLD AUTO: 11.6 % (ref 27–41)
MCH RBC QN AUTO: 28.6 PG (ref 27–31)
MCHC RBC AUTO-ENTMCNC: 32.3 G/DL (ref 32–36)
MCV RBC AUTO: 88.7 FL (ref 80–96)
MONOCYTES # BLD AUTO: 1.79 K/UL (ref 0–0.8)
MONOCYTES NFR BLD AUTO: 9.2 % (ref 2–6)
MPC BLD CALC-MCNC: 10.8 FL (ref 9.4–12.4)
NEUTROPHILS # BLD AUTO: 15.05 K/UL (ref 1.8–7.7)
NEUTROPHILS NFR BLD AUTO: 77.6 % (ref 53–65)
NRBC # BLD AUTO: 0 X10E3/UL
NRBC, AUTO (.00): 0 %
PLATELET # BLD AUTO: 318 K/UL (ref 150–400)
RBC # BLD AUTO: 3.18 M/UL (ref 4.2–5.4)
WBC # BLD AUTO: 19.4 K/UL (ref 4.5–11)

## 2025-07-04 PROCEDURE — 25000003 PHARM REV CODE 250: Performed by: OBSTETRICS & GYNECOLOGY

## 2025-07-04 PROCEDURE — 36415 COLL VENOUS BLD VENIPUNCTURE: CPT | Performed by: OBSTETRICS & GYNECOLOGY

## 2025-07-04 PROCEDURE — 99900035 HC TECH TIME PER 15 MIN (STAT)

## 2025-07-04 PROCEDURE — 85025 COMPLETE CBC W/AUTO DIFF WBC: CPT | Performed by: OBSTETRICS & GYNECOLOGY

## 2025-07-04 PROCEDURE — 63600175 PHARM REV CODE 636 W HCPCS: Mod: JZ,TB | Performed by: OBSTETRICS & GYNECOLOGY

## 2025-07-04 PROCEDURE — 51702 INSERT TEMP BLADDER CATH: CPT

## 2025-07-04 PROCEDURE — 11000001 HC ACUTE MED/SURG PRIVATE ROOM

## 2025-07-04 RX ORDER — DOCUSATE SODIUM 100 MG/1
200 CAPSULE, LIQUID FILLED ORAL 2 TIMES DAILY
Status: DISCONTINUED | OUTPATIENT
Start: 2025-07-04 | End: 2025-07-06 | Stop reason: HOSPADM

## 2025-07-04 RX ADMIN — OXYCODONE AND ACETAMINOPHEN 1 TABLET: 10; 325 TABLET ORAL at 05:07

## 2025-07-04 RX ADMIN — SODIUM CHLORIDE, POTASSIUM CHLORIDE, SODIUM LACTATE AND CALCIUM CHLORIDE: 600; 310; 30; 20 INJECTION, SOLUTION INTRAVENOUS at 12:07

## 2025-07-04 RX ADMIN — KETOROLAC TROMETHAMINE 30 MG: 30 INJECTION, SOLUTION INTRAMUSCULAR at 03:07

## 2025-07-04 RX ADMIN — Medication 1 TABLET: at 09:07

## 2025-07-04 RX ADMIN — CEFAZOLIN 2 G: 2 INJECTION, POWDER, FOR SOLUTION INTRAMUSCULAR; INTRAVENOUS at 11:07

## 2025-07-04 RX ADMIN — KETOROLAC TROMETHAMINE 30 MG: 30 INJECTION, SOLUTION INTRAMUSCULAR at 09:07

## 2025-07-04 RX ADMIN — DOCUSATE SODIUM 200 MG: 100 CAPSULE, LIQUID FILLED ORAL at 09:07

## 2025-07-04 RX ADMIN — SIMETHICONE 80 MG: 80 TABLET, CHEWABLE ORAL at 04:07

## 2025-07-04 RX ADMIN — OXYCODONE AND ACETAMINOPHEN 1 TABLET: 10; 325 TABLET ORAL at 09:07

## 2025-07-04 RX ADMIN — OXYCODONE AND ACETAMINOPHEN 1 TABLET: 10; 325 TABLET ORAL at 11:07

## 2025-07-04 RX ADMIN — CEFAZOLIN 2 G: 2 INJECTION, POWDER, FOR SOLUTION INTRAMUSCULAR; INTRAVENOUS at 03:07

## 2025-07-04 RX ADMIN — IBUPROFEN 800 MG: 800 TABLET, FILM COATED ORAL at 09:07

## 2025-07-04 NOTE — H&P
[facility] -  Labor and Delivery  Obstetrics  History & Physical    Patient Name: Britney Jarrett  MRN: [id number]  Admission Date: 7/3/2025  Primary Care Provider: Ann, Primary Doctor    Subjective:     Principal Problem:Fetal heart rate non-reassuring affecting management of mother    History of Present Illness: 36 yo  c IUP @ 36+2 weeks presents to the OB ED c/o contractions increasing today and back pain in her left lower side. She was on Procardia for  contractions. This was stopped yesterday at 36 weeks. She does have a h/o delivery at 36 weeks x 2 due to  labor. She has a h/o C/S x 2.   She denies any VB or LOF. Good FM.  She denies any n/v. She feels like she is well hydrated.   Denies any dysuria or changes in Bms.  Describes the back pain as a tight muscle pain with sharp intermittent pains.  Due to the pain she did not sleep well last night.    Then during her work up the FHTs started to have recurrent prolonged decelerations to the 80s. Expedited delivery via RLTCS with bilateral salpingectomy performed.    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    OB History    Para Term  AB Living   6 2 0 2 3 2   SAB IAB Ectopic Multiple Live Births   1 1 0 0 2      # Outcome Date GA Lbr Raul/2nd Weight Sex Type Anes PTL Lv   6 Current            5  23 36w0d  2.608 kg (5 lb 12 oz) M CS-LTranv Spinal Y VALENTIN      Complications: Pre-eclampsia   4  / 36w0d  2.637 kg (5 lb 13 oz) M CS-LTranv EPI  VALENTIN      Birth Comments: Nuchal cord x3   3 AB            2 IAB            1 SAB              Past Medical History:   Diagnosis Date    Migraine headache     Migraines      Past Surgical History:   Procedure Laterality Date     SECTION       INDUCED       TONSILLECTOMY         PTA Medications   Medication Sig    ferrous sulfate 325 (65 FE) MG EC tablet Take 1 tablet (325 mg total) by mouth 2 (two) times daily.    magnesium oxide (MAG-OX) 400 mg (241.3 mg magnesium) tablet Take 400 mg by mouth once daily.    NIFEdipine (PROCARDIA) 10 MG Cap Take 1 capsule (10 mg total) by mouth every 6 (six) hours. for 14 days    PNV,calcium 72-iron-folic acid (PRENATAL VITAMIN PLUS LOW IRON) 27 mg iron- 1 mg Tab Take 1 tablet by mouth.    vitamin D (VITAMIN D3) 1000 units Tab Take 1,000 Units by mouth once daily.       Review of patient's allergies indicates:  No Known Allergies     Family History       Problem Relation (Age of Onset)    Hypertension Father          Tobacco Use    Smoking status: Never     Passive exposure: Never    Smokeless tobacco: Never   Substance and Sexual Activity    Alcohol use: Not Currently    Drug use: Never    Sexual activity: Yes     Partners: Male     Birth control/protection: None     Review of Systems   Constitutional:  Positive for fatigue.   Gastrointestinal:  Positive for abdominal pain.   Musculoskeletal:  Positive for back pain.      Objective:     Vital Signs (Most Recent):  Temp: 97.3 °F (36.3 °C) (25)  Pulse: 87 (25)  Resp: 18 (25)  BP: 120/64 (25)  SpO2: 99 % (25) Vital Signs (24h Range):  Temp:  [97.3 °F (36.3 °C)-98.1 °F (36.7 °C)] 97.3 °F (36.3 °C)  Pulse:  [] 87  Resp:  [18-20] 18  SpO2:  [99 %-100 %] 99 %  BP: (118-120)/(64-72) 120/64     Weight: 93.9 kg (207 lb 1.6 oz)  Body mass index is 37.88 kg/m².    FHT: 150s moderate variability +accels. Then recurrent prolonged late decelerations after a long contraction. Improved with intrauterine resuscitation. Cat 2 (non-reassuring)  TOCO:  Q 3-5 minutes    Physical Exam:   Constitutional: She is oriented to person, place, and time. She appears well-developed and well-nourished.    HENT:   Head: Normocephalic  and atraumatic.      Cardiovascular:  Normal rate.      Exam reveals no edema.        Pulmonary/Chest: Effort normal.        Abdominal: Soft. There is no abdominal tenderness.   Gravid.  on the left lumbar area.                 Neurological: She is alert and oriented to person, place, and time.    Skin: Skin is warm and dry.    Psychiatric: She has a normal mood and affect. Her behavior is normal.       Cervix:  Dilation:  0.5  Effacement:  50%  Station: -3  Presentation: Vertex     Significant Labs:  Lab Results   Component Value Date    GROUPTRH A POS 07/03/2025    HEPBSAG Non-Reactive 07/03/2025       CBC:   Recent Labs   Lab 07/03/25 2029   WBC 18.70*   RBC 3.72*   HGB 10.3*   HCT 32.2*      MCV 86.6   MCH 27.7   MCHC 32.0     CMP:   Recent Labs   Lab 07/03/25 2029   GLU 88   CALCIUM 8.5   ALBUMIN 2.9*   PROT 7.0      K 3.8   CO2 22      BUN 9   CREATININE 0.70   ALKPHOS 125   ALT 8   AST 15   BILITOT 0.5     Recent Labs   Lab 07/02/25  1031 07/03/25  1832   COLORU yellow Light Yellow   CLARITYU clear  --    SPECGRAV 1.025 1.017   PHUR 6.5 6.0   PROTEINUA  --  Negative   NITRITE  --  Negative   LEUKOCYTESUR  --  Negative   UROBILINOGEN  --  Normal     I have personallly reviewed all pertinent lab results from the last 24 hours.  Recent Lab Results         07/03/25  2105 07/03/25  2101 07/03/25 2029 07/03/25  1832        POC A-aDO2             Albumin/Globulin Ratio     0.7         Albumin     2.9         ALP     125         ALT     8         Anion Gap     11         Appearance, UA       Clear       AST     15         Baso #     0.04         Basophil %     0.2         Bilirubin (UA)       Negative       BILIRUBIN TOTAL     0.5         BUN     9         BUN/CREAT RATIO     13         Calcium     8.5         Chloride     107         CO2     22         Color, UA       Light Yellow       Creatinine     0.70         Differential Method     Auto         eGFR     117  Comment:  Estimated GFR calculated using the CKD-EPI creatinine () equation.         Eos #     0.08         Eos %     0.4         Globulin, Total     4.1         Glucose     88         Glucose, UA       Normal       Group & Rh     A POS         Hematocrit     32.2         Hemoglobin     10.3         Hepatitis B Surface Ag     Non-Reactive         HIV 1/2 Ag/Ab     Non-Reactive         Immature Grans (Abs)     0.33         Immature Granulocytes     1.8         INDIRECT FREDERICK     NEG         Ketones, UA       Negative       Leukocyte Esterase, UA       Negative       Lymph #     1.71         Lymph %     9.1         MCH     27.7         MCHC     32.0         MCV     86.6         Mono #     2.38         Mono %     12.7         MPV     10.7         Neutrophils, Abs     14.16         Neutrophils Relative     75.8         NITRITE UA       Negative       nRBC     0.2         NUCLEATED RBC ABSOLUTE     0.03         Blood, UA       Negative       pH, UA       6.0       Platelet Count     377         POC Base Excess -4.3   -3.1           POC HCO3 25.2   26.9           POC PCO2 66   72           POC PH 7.19   7.18           POC PO2 30   23           POC SATURATED O2 51.3   31.6           Potassium     3.8         PROTEIN TOTAL     7.0         Protein, UA       Negative       RBC     3.72         RDW     13.2         Sodium     136         Spec Grav UA       1.017       Specimen Outdate     2025 23:59         Syphilis Ab Interpretation     Non-Reactive  Comment: 0.0 - 0.9: Non-Reactive  0.91 - 1.10: Equivocal with RPR to follow  >1.10:  Reactive with RPR to Follow         UROBILINOGEN UA       Normal       WBC     18.70                 Assessment/Plan:     35 y.o. female  at 36w2d for:    Active Diagnoses:    Diagnosis Date Noted POA    PRINCIPAL PROBLEM:  Fetal heart rate non-reassuring affecting management of mother [O36.8390] 2025 Yes     uterine contractions, antepartum, third trimester [O47.03]  2025 Yes    Back pain affecting pregnancy in third trimester [O99.891, M54.9] 2025 Yes    History of 2  sections [Z98.891] 2025 Not Applicable    Request for sterilization [Z30.2] 2025 Not Applicable    36 weeks gestation of pregnancy [Z3A.36] 2025 Not Applicable      Problems Resolved During this Admission:       Due to prolonged recurrent fetal heart rate decelerations, the decision was made to proceed with STAT C/S. She also desired permanent sterilization. Therefore, a RLTCS c BS was planned.   R/B/A d/w pt. All questions answered. Consents reviewed and signed.  Anesthesia and NICU notified.   General anesthesia planned.     Con Taylor MD  Obstetrics  [facility] -  Labor and Delivery

## 2025-07-04 NOTE — ED NOTES
Dr Taylor called to ob ed 3 for fetal heart deceleration. Notified of interventions with no success. Stat  section called at this time.

## 2025-07-04 NOTE — LACTATION NOTE
This note was copied from a baby's chart.  [facility] - NICU  Lactation Note    Infant's Name: Baby Girl Britney    MRN:  [id number]  : 7[birth year]  Birth Weight: 3028/6.11   GA: 36.2         Mother's Name: Britney Jarrett      MRN: [id number]   Phone: [phone]  Age: 35    G 6 P 3         OB: Taylor        Method of Delivery: c/s  Previous BF Experience:        Pump Set Up: ALEXIS Narvaez, 25 @ 1150   Home use pump:yes      Education:   Pump use  Labeling breast milk  Cleaning breast pump  Pump 8 or more times per day

## 2025-07-04 NOTE — ANESTHESIA PROCEDURE NOTES
Intubation    Date/Time: 7/3/2025 8:46 PM    Performed by: Burak Baptiste MD  Authorized by: Burak Baptiste MD    Intubation:     Induction:  Intravenous    Intubated:  Postinduction    Mask Ventilation:  Easy mask    Attempts:  1    Attempted By:  Staff anesthesiologist    Method of Intubation:  Direct    Blade:  Gabbie 3    Laryngeal View Grade: Grade IIA - cords partially seen      Difficult Airway Encountered?: No      Complications:  None    Airway Device:  Oral endotracheal tube    Airway Device Size:  7.0    Style/Cuff Inflation:  Cuffed (inflated to minimal occlusive pressure)    Inflation Amount (mL):  7    Tube secured:  21    Placement Verified By:  Capnometry    Complicating Factors:  None    Findings Post-Intubation:  BS equal bilateral and atraumatic/condition of teeth unchanged

## 2025-07-04 NOTE — PLAN OF CARE
[facility] -  Labor and Delivery  Initial Discharge Assessment       Primary Care Provider: Ann, Primary Doctor    Admission Diagnosis: Pregnancy [Z34.90]  36 weeks gestation of pregnancy [Z3A.36]  Fetal intolerance to labor, delivered, current hospitalization [O77.9]  Non-reassuring electronic fetal monitoring tracing [O36.8390]  Back pain complicating pregnancy, third trimester [O99.891, M54.9]  History of  delivery, currently pregnant in third trimester [O09.893]  History of 2  sections [Z98.891]   uterine contractions in third trimester, antepartum [O47.03]    Admission Date: 7/3/2025  Expected Discharge Date:          Payor: [facility] / Plan: [facility] CHOICE PLUS / Product Type: Commercial /     Extended Emergency Contact Information  Primary Emergency Contact: Jalen Jarrett  Mobile Phone: [phone]  Relation: Spouse  Preferred language: English   needed? No  Secondary Emergency Contact: Lashawn Wagner   [place]  Mobile Phone: [phone]  Relation: Mother    Discharge Plan A: Home with family  Discharge Plan B: Home with family      [facility] [place] - [address]  [place] [address]  Phone: [phone] Fax: [phone]           CM consulted on pt due to infant being admitted to NICU. No concerns or issues identified. Discharge plan is to return home with no needs.

## 2025-07-04 NOTE — L&D DELIVERY NOTE
[facility] -  Labor and Delivery   Section   Operative Note    SUMMARY     Date of Procedure: 7/3/2025     Procedure: Procedure(s) (LRB):   SECTION (N/A) Repeat low transverse and bilateral salpingectomy.  STAT under general anesthesia    Surgeons and Role:     * Con Taylor MD - Primary    Assisting Surgeon: None    Pre-Operative Diagnosis: Fetal intolerance to labor, delivered, current hospitalization [O77.9]  Non-reassuring electronic fetal monitoring tracing [O36.8390]   uterine contractions  History of  section x 2  Desires Sterilization  IUP @ 36 weeks    Post-Operative Diagnosis: Post-Op Diagnosis Codes:     * Fetal intolerance to labor, delivered, current hospitalization [O77.9]     * Non-reassuring electronic fetal monitoring tracing [O36.8390]   uterine contractions  History of  section x 2  Desires Sterilization  IUP @ 36 weeks    Anesthesia: Spinal/Epidural    Technical Procedures Used: See below.           Description of the Findings of the Procedure:   Viable female infant born at  weight 6 lbs 10.8 oz (3028 g) APGAR 8/9.    Significant Surgical Tasks Conducted by the Assistant(s), if Applicable: n/a    Complications: No    Blood Loss: * No values recorded between 7/3/2025  8:38 PM and 7/3/2025  9:56 PM * 500 cc.     Patient was identified and consents were reviewed.   Patient was taken to OR with IVF running. Johnson placed in the patient room. With patient in supine position in left lateral tilt, positioning to supine done.   Abdomen prepped with Betadine due to STAT nature draping of the abdomen.   Time out taken with OR team members.  General anesthesia with endotracheal intubation performed.    A Pfannenstiel skin incision was made through the skin, transverse fascial incision developed, rectus muscles  in the midline and the peritoneum entered bluntly.   no adhesions noted.    Dougie retractor was inserted into the abdominal  cavity.  The lower uterine segment and position of the fetus identified.    The lower uterine segment was noted to thin. Bladder flap attempted to be developed, but the uterus was entered doing this.  A Low Transverse hysterotomy was made through well developed lower uterine segment and extended superiorly and inferiorly with blunt dissection.   Clear fluid noted.  Infant delivered from vertex presentation atraumatically.  Cord clamped after one minute and  handed to attending nurse.  Cord blood and gas taken, placenta expressed.  The uterus wasnot exteriorized and cleared of all clot and debris.  The hysterotomy was closed with 0-Monocryl in running locking fashion. Excellent hemostasis noted.   Then the uterus was exteriorized.  Attention turned to right adnexa. Tube identified and followed out to the fimbriae which was grasped with a Taylor clamp. Voyant device used to perform salpingectomy. Excellent hemostasis was noted. Same procedure was performed on the left. Specimen passed off and sent to pathology.   The gutters were cleaned with moist laparotomy sponge.   The Dougie retractor was removed.     Fascia was closed with 0 looped PDS in running fashion. Subcutaneous tissue was reapproximated with 2-0 plain gut.  Skin closure with 4 0 Monocryl in a subcuticular fashion.  Wound dressed with MINH dressing.           Specimens:   Specimen (24h ago, onward)       Start     Ordered    25  Surgical Pathology  Once        Question Answer Comment   Number of specimens 3    Source(s): Fallopian Tube, Left    Source(s): Fallopian Tube, Right    Source(s): Placenta    Gestational age (weeks) 36wks    Clinical History: nonreassuring fetal heart tones, gbs positive,         25                    Condition: Good    VTE Risk Mitigation (From admission, onward)           Ordered     IP VTE HIGH RISK PATIENT  Once         25     Place sequential compression device  Until  "discontinued         25     Place sequential compression device  Until discontinued         25                    Disposition: PACU - hemodynamically stable.    Attestation: Good         Delivery Information for Girl Britney Jarrett    Birth information:  YOB: 2023   Time of birth: 8:53 PM   Sex: female   Head Delivery Date/Time:     Delivery type:    Gestational Age: 36w2d       Delivery Providers    Delivering clinician:            Measurements    Weight: 3028 g  Weight (lbs): 6 lb 10.8 oz  Length: 48.3 cm  Length (in): 19"         Apgars    Living status: Living  Apgar Component Scores:  1 min.:  5 min.:  10 min.:  15 min.:  20 min.:    Skin color:  1  1       Heart rate:  2  2       Reflex irritability:  2  2       Muscle tone:  2  2       Respiratory effort:  1  2       Total:  8  9       Apgars assigned by: ROSALIA VALDES RN; ROBERTH MCDONALD                         Interventions/Resuscitation           Cord    No data filed       Placenta    Placenta delivery date/time: 7/3/2025 20:55  Placenta removal: Manual removal  Placenta appearance: Intact  Placenta disposition: Discarded           Labor Events:       labor:       Labor Onset Date/Time:         Dilation Complete Date/Time:         Start Pushing Date/Time:       Rupture Date/Time:            Rupture type:          Fluid Amount:       Fluid Color:        steroids:       Antibiotics given for GBS:       Induction:       Indications for induction:        Augmentation:       Indications for augmentation:       Labor complications:       Additional complications:          Cervical ripening:                     Delivery:      Episiotomy:       Indication for Episiotomy:       Perineal Lacerations:   Repaired:      Periurethral Laceration:   Repaired:     Labial Laceration:   Repaired:     Sulcus Laceration:   Repaired:     Vaginal Laceration:   Repaired:     Cervical Laceration:   Repaired:     Repair suture:     "   Repair # of packets:       Last Value - EBL - Nursing (mL):       Sum - EBL - Nursing (mL): 0     Last Value - EBL - Anesthesia (mL):      Calculated QBL (mL):       Running total QBL (mL):       Vaginal Sweep Performed:       Surgicount Correct:         Other providers:            Details (if applicable):  Trial of Labor      Categorization:      Priority:     Indications for :     Incision Type:       Additional  information:  Forceps:    Vacuum:    Breech:    Observed anomalies    Other (Comments):

## 2025-07-04 NOTE — ED NOTES
Dr. Baptiste phoned at this time et given report on STAT c/s called on [facility] for fetal intolerance et NRFHTs; rec'd order from Dr. Baptiste to call NENA Mckinney

## 2025-07-04 NOTE — ED NOTES
Phoned NENA Mckinney at this time per Dr. Baptiste to call him et given report on STAT c/s called on pt here in [facility]; states he is on the way

## 2025-07-04 NOTE — TRANSFER OF CARE
"Anesthesia Transfer of Care Note    Patient: Britney Jarrett    Procedure(s) Performed: Procedure(s) (LRB):   SECTION (N/A)    Patient location: Labor and Delivery    Transport from OR: Transported from OR on room air with adequate spontaneous ventilation    Post pain: pain needs to be addressed    Post assessment: no apparent anesthetic complications and tolerated procedure well    Post vital signs: stable    Level of consciousness: lethargic    Complications: none    Transfer of care protocol was followed      Last vitals: Visit Vitals  /84   Pulse 102   Temp 36.2 °C (97.1 °F) (Oral)   Resp 20   Ht 5' 2" (1.575 m)   Wt 93.9 kg (207 lb 1.6 oz)   LMP 10/03/2024   SpO2 99%   BMI 37.88 kg/m²     "

## 2025-07-05 PROCEDURE — 99900035 HC TECH TIME PER 15 MIN (STAT)

## 2025-07-05 PROCEDURE — 25000003 PHARM REV CODE 250: Performed by: OBSTETRICS & GYNECOLOGY

## 2025-07-05 PROCEDURE — 11000001 HC ACUTE MED/SURG PRIVATE ROOM

## 2025-07-05 PROCEDURE — 94761 N-INVAS EAR/PLS OXIMETRY MLT: CPT

## 2025-07-05 RX ADMIN — DOCUSATE SODIUM 200 MG: 100 CAPSULE, LIQUID FILLED ORAL at 10:07

## 2025-07-05 RX ADMIN — OXYCODONE AND ACETAMINOPHEN 1 TABLET: 10; 325 TABLET ORAL at 04:07

## 2025-07-05 RX ADMIN — OXYCODONE AND ACETAMINOPHEN 1 TABLET: 10; 325 TABLET ORAL at 02:07

## 2025-07-05 RX ADMIN — IBUPROFEN 800 MG: 800 TABLET, FILM COATED ORAL at 02:07

## 2025-07-05 RX ADMIN — IBUPROFEN 800 MG: 800 TABLET, FILM COATED ORAL at 10:07

## 2025-07-05 RX ADMIN — Medication 1 TABLET: at 09:07

## 2025-07-05 RX ADMIN — DOCUSATE SODIUM 200 MG: 100 CAPSULE, LIQUID FILLED ORAL at 02:07

## 2025-07-05 RX ADMIN — IBUPROFEN 800 MG: 800 TABLET, FILM COATED ORAL at 04:07

## 2025-07-05 RX ADMIN — OXYCODONE AND ACETAMINOPHEN 1 TABLET: 10; 325 TABLET ORAL at 10:07

## 2025-07-05 NOTE — PROGRESS NOTES
[facility] -  Labor and Delivery  Obstetrics  Postpartum Progress Note    Patient Name: Britney Jarrett  MRN: [id number]  Admission Date: 7/3/2025  Hospital Length of Stay: 2 days  Attending Physician: Con Taylor MD  Primary Care Provider: Ann, Primary Doctor    Subjective:     Principal Problem:Fetal heart rate non-reassuring affecting management of mother    Hospital Course:  7/4/25 POD# 1  Patient without complaints.  Vital signs stable afebrile.  Continue present management.    Interval History:     Pt of TANYA Mckinney (C/s by Dr Taylor)    POD#2 R-LTCS and BTL    She is doing well this morning. She is tolerating a regular diet without nausea or vomiting. She is voiding spontaneously. She is ambulating. She has passed flatus, and has a BM. Vaginal bleeding is mild. She denies fever or chills. Abdominal pain is mild and controlled with oral medications. She is breastfeeding (breastpumping)    Objective:     Vital Signs (Most Recent):  Temp: 98.3 °F (36.8 °C) (07/05/25 0729)  Pulse: 68 (07/05/25 0729)  Resp: 18 (07/05/25 0729)  BP: (!) 99/58 (07/05/25 0729)  SpO2: 98 % (07/05/25 0729) Vital Signs (24h Range):  Temp:  [96.8 °F (36 °C)-98.7 °F (37.1 °C)] 98.3 °F (36.8 °C)  Pulse:  [68-91] 68  Resp:  [18-20] 18  SpO2:  [98 %-99 %] 98 %  BP: ()/(50-68) 99/58     Weight: 93.9 kg (207 lb 1.6 oz)  Body mass index is 37.88 kg/m².      Intake/Output Summary (Last 24 hours) at 7/5/2025 1043  Last data filed at 7/4/2025 1845  Gross per 24 hour   Intake --   Output 1000 ml   Net -1000 ml         Significant Labs:  Lab Results   Component Value Date    GROUPTRH A POS 07/03/2025    HEPBSAG Non-Reactive 07/03/2025     Recent Labs   Lab 07/04/25  0559   HGB 9.1*   HCT 28.2*       I have personallly reviewed all pertinent lab results from the last 24 hours.  Recent Lab Results       None            Physical Exam:   Constitutional: She is oriented to person, place, and time. She appears well-developed and well-nourished.     HENT:   Head: Normocephalic and atraumatic.    Eyes: Pupils are equal, round, and reactive to light.     Cardiovascular:  Normal rate and regular rhythm.      Exam reveals no edema.        Pulmonary/Chest: Effort normal and breath sounds normal. No respiratory distress.        Abdominal: Soft. Bowel sounds are normal. She exhibits no distension, no mass and no abdominal incision (Prevena dressing in place - few small spots of blood only.). There is no abdominal tenderness. There is no rebound and no guarding.   Fundus firm, below umbilicus and non-tender     Genitourinary:    Pelvic exam was performed with patient supine.   The external female genitalia was normal.   No external genitalia lesions identified,Genitalia hair distrobution normal .             Musculoskeletal: Normal range of motion.       Neurological: She is alert and oriented to person, place, and time. She has normal reflexes. She displays normal reflexes. She exhibits normal muscle tone.    Skin: Skin is warm and dry. No rash noted. No erythema.    Psychiatric: She has a normal mood and affect. Her behavior is normal.       Review of Systems   Constitutional:  Negative for chills and fever.   Respiratory:  Negative for cough and shortness of breath.    Cardiovascular:  Negative for chest pain and leg swelling.   Gastrointestinal:  Negative for abdominal pain (appropriate postop pain), blood in stool, constipation, diarrhea, nausea and vomiting.   Genitourinary:  Negative for dysuria, flank pain, frequency, hematuria, pelvic pain, urgency, vaginal bleeding, vaginal discharge, vaginal pain and vaginal odor.   Musculoskeletal:  Negative for back pain.   Neurological:  Negative for syncope and headaches.   Psychiatric/Behavioral:  Negative for depression. The patient is not nervous/anxious.      Assessment/Plan:     35 y.o. female  for:    Delivery by  section using transverse incision of lower segment of uterus    POD#2 R-LTCS  and  BTL  Doing well  Cont PP care  Expect DC AM 7/6/25        Disposition: As patient meets milestones, will plan to discharge AM 7/6.    Orestes Colon MD  Obstetrics  [facility] -  Labor and Delivery

## 2025-07-05 NOTE — SUBJECTIVE & OBJECTIVE
Interval History: POD# 1    She is doing well this morning. She is tolerating a regular diet without nausea or vomiting. She is voiding spontaneously. She is ambulating. She has not passed flatus, and has not a BM. Vaginal bleeding is mild. She denies fever or chills. Abdominal pain is mild and controlled with oral medications. She Is breastfeeding. She desires circumcision for her male baby: not applicable.    Objective:     Vital Signs (Most Recent):  Temp: 97.9 °F (36.6 °C) (07/05/25 0104)  Pulse: 91 (07/05/25 0104)  Resp: 18 (07/05/25 0104)  BP: 109/60 (07/05/25 0104)  SpO2: 99 % (07/04/25 1916) Vital Signs (24h Range):  Temp:  [97 °F (36.1 °C)-98.7 °F (37.1 °C)] 97.9 °F (36.6 °C)  Pulse:  [] 91  Resp:  [16-20] 18  SpO2:  [93 %-99 %] 99 %  BP: ()/(50-72) 109/60     Weight: 93.9 kg (207 lb 1.6 oz)  Body mass index is 37.88 kg/m².      Intake/Output Summary (Last 24 hours) at 7/5/2025 0116  Last data filed at 7/4/2025 1845  Gross per 24 hour   Intake --   Output 2100 ml   Net -2100 ml         Significant Labs:  Lab Results   Component Value Date    GROUPTRH A POS 07/03/2025    HEPBSAG Non-Reactive 07/03/2025     Recent Labs   Lab 07/04/25  0559   HGB 9.1*   HCT 28.2*       I have personallly reviewed all pertinent lab results from the last 24 hours.    Physical Exam:   Constitutional: She appears well-developed and well-nourished. No distress.       Cardiovascular:  Normal rate and regular rhythm.             Pulmonary/Chest: Effort normal and breath sounds normal. No respiratory distress.        Abdominal: Soft. There is no abdominal tenderness.             Musculoskeletal: Moves all extremeties.        Skin: Skin is warm and dry.    Psychiatric: She has a normal mood and affect. Her behavior is normal.       Review of Systems   Constitutional:  Negative for chills.   Eyes:  Negative for visual disturbance.   Respiratory:  Negative for cough and shortness of breath.    Gastrointestinal:  Negative for  abdominal pain and bloating.   Musculoskeletal:  Negative for back pain and myalgias.   Neurological:  Negative for headaches.

## 2025-07-05 NOTE — PROGRESS NOTES
[facility] -  Labor and Delivery  Obstetrics  Postpartum Progress Note    Patient Name: Britney Jarrett  MRN: [id number]  Admission Date: 7/3/2025  Hospital Length of Stay: 2 days  Attending Physician: Con Taylor MD  Primary Care Provider: Ann, Primary Doctor    Subjective:     Principal Problem:Fetal heart rate non-reassuring affecting management of mother    Hospital Course:  7/4/25 POD# 1  Patient without complaints.  Vital signs stable afebrile.  Continue present management.    Interval History: POD# 1    She is doing well this morning. She is tolerating a regular diet without nausea or vomiting. She is voiding spontaneously. She is ambulating. She has not passed flatus, and has not a BM. Vaginal bleeding is mild. She denies fever or chills. Abdominal pain is mild and controlled with oral medications. She Is breastfeeding. She desires circumcision for her male baby: not applicable.    Objective:     Vital Signs (Most Recent):  Temp: 97.9 °F (36.6 °C) (07/05/25 0104)  Pulse: 91 (07/05/25 0104)  Resp: 18 (07/05/25 0104)  BP: 109/60 (07/05/25 0104)  SpO2: 99 % (07/04/25 1916) Vital Signs (24h Range):  Temp:  [97 °F (36.1 °C)-98.7 °F (37.1 °C)] 97.9 °F (36.6 °C)  Pulse:  [] 91  Resp:  [16-20] 18  SpO2:  [93 %-99 %] 99 %  BP: ()/(50-72) 109/60     Weight: 93.9 kg (207 lb 1.6 oz)  Body mass index is 37.88 kg/m².      Intake/Output Summary (Last 24 hours) at 7/5/2025 0116  Last data filed at 7/4/2025 1845  Gross per 24 hour   Intake --   Output 2100 ml   Net -2100 ml         Significant Labs:  Lab Results   Component Value Date    GROUPTRH A POS 07/03/2025    HEPBSAG Non-Reactive 07/03/2025     Recent Labs   Lab 07/04/25  0559   HGB 9.1*   HCT 28.2*       I have personallly reviewed all pertinent lab results from the last 24 hours.    Physical Exam:   Constitutional: She appears well-developed and well-nourished. No distress.       Cardiovascular:  Normal rate and regular rhythm.              Pulmonary/Chest: Effort normal and breath sounds normal. No respiratory distress.        Abdominal: Soft. There is no abdominal tenderness.             Musculoskeletal: Moves all extremeties.        Skin: Skin is warm and dry.    Psychiatric: She has a normal mood and affect. Her behavior is normal.       Review of Systems   Constitutional:  Negative for chills.   Eyes:  Negative for visual disturbance.   Respiratory:  Negative for cough and shortness of breath.    Gastrointestinal:  Negative for abdominal pain and bloating.   Musculoskeletal:  Negative for back pain and myalgias.   Neurological:  Negative for headaches.     Assessment/Plan:     35 y.o. female  for:    Delivery by  section using transverse incision of lower segment of uterus  Stable postoperative day 1.    36 weeks gestation of pregnancy  .        Disposition: As patient meets milestones, will plan to discharge home home.    Fredy Benites MD  Obstetrics  [facility] -  Labor and Delivery

## 2025-07-05 NOTE — SUBJECTIVE & OBJECTIVE
Interval History:     Pt of TANYA Mckinney (C/s by Dr Taylor)    POD#2 R-LTCS and BTL    She is doing well this morning. She is tolerating a regular diet without nausea or vomiting. She is voiding spontaneously. She is ambulating. She has passed flatus, and has a BM. Vaginal bleeding is mild. She denies fever or chills. Abdominal pain is mild and controlled with oral medications. She is breastfeeding (breastpumping)    Objective:     Vital Signs (Most Recent):  Temp: 98.3 °F (36.8 °C) (07/05/25 0729)  Pulse: 68 (07/05/25 0729)  Resp: 18 (07/05/25 0729)  BP: (!) 99/58 (07/05/25 0729)  SpO2: 98 % (07/05/25 0729) Vital Signs (24h Range):  Temp:  [96.8 °F (36 °C)-98.7 °F (37.1 °C)] 98.3 °F (36.8 °C)  Pulse:  [68-91] 68  Resp:  [18-20] 18  SpO2:  [98 %-99 %] 98 %  BP: ()/(50-68) 99/58     Weight: 93.9 kg (207 lb 1.6 oz)  Body mass index is 37.88 kg/m².      Intake/Output Summary (Last 24 hours) at 7/5/2025 1043  Last data filed at 7/4/2025 1845  Gross per 24 hour   Intake --   Output 1000 ml   Net -1000 ml         Significant Labs:  Lab Results   Component Value Date    GROUPTRH A POS 07/03/2025    HEPBSAG Non-Reactive 07/03/2025     Recent Labs   Lab 07/04/25  0559   HGB 9.1*   HCT 28.2*       I have personallly reviewed all pertinent lab results from the last 24 hours.  Recent Lab Results       None            Physical Exam:   Constitutional: She is oriented to person, place, and time. She appears well-developed and well-nourished.    HENT:   Head: Normocephalic and atraumatic.    Eyes: Pupils are equal, round, and reactive to light.     Cardiovascular:  Normal rate and regular rhythm.      Exam reveals no edema.        Pulmonary/Chest: Effort normal and breath sounds normal. No respiratory distress.        Abdominal: Soft. Bowel sounds are normal. She exhibits no distension, no mass and no abdominal incision (Prevena dressing in place - few small spots of blood only.). There is no abdominal tenderness. There is no  rebound and no guarding.   Fundus firm, below umbilicus and non-tender     Genitourinary:    Pelvic exam was performed with patient supine.   The external female genitalia was normal.   No external genitalia lesions identified,Genitalia hair distrobution normal .             Musculoskeletal: Normal range of motion.       Neurological: She is alert and oriented to person, place, and time. She has normal reflexes. She displays normal reflexes. She exhibits normal muscle tone.    Skin: Skin is warm and dry. No rash noted. No erythema.    Psychiatric: She has a normal mood and affect. Her behavior is normal.       Review of Systems   Constitutional:  Negative for chills and fever.   Respiratory:  Negative for cough and shortness of breath.    Cardiovascular:  Negative for chest pain and leg swelling.   Gastrointestinal:  Negative for abdominal pain (appropriate postop pain), blood in stool, constipation, diarrhea, nausea and vomiting.   Genitourinary:  Negative for dysuria, flank pain, frequency, hematuria, pelvic pain, urgency, vaginal bleeding, vaginal discharge, vaginal pain and vaginal odor.   Musculoskeletal:  Negative for back pain.   Neurological:  Negative for syncope and headaches.   Psychiatric/Behavioral:  Negative for depression. The patient is not nervous/anxious.

## 2025-07-05 NOTE — HOSPITAL COURSE
7/4/25 POD# 1  Patient without complaints.  Vital signs stable afebrile.  Continue present management.        7/6/25 POD# 3  Patient without complaints  VSSAF  DC home    Fredy Benites MD  OB Hospitalist  Hospitalist phone: [phone]  07/06/2025   10:45 AM

## 2025-07-06 VITALS
TEMPERATURE: 98 F | HEIGHT: 62 IN | HEART RATE: 79 BPM | SYSTOLIC BLOOD PRESSURE: 106 MMHG | WEIGHT: 207.13 LBS | DIASTOLIC BLOOD PRESSURE: 68 MMHG | OXYGEN SATURATION: 99 % | RESPIRATION RATE: 18 BRPM | BODY MASS INDEX: 38.12 KG/M2

## 2025-07-06 PROBLEM — O47.03 PRETERM UTERINE CONTRACTIONS, ANTEPARTUM, THIRD TRIMESTER: Status: RESOLVED | Noted: 2025-07-03 | Resolved: 2025-07-06

## 2025-07-06 PROBLEM — O36.8390 FETAL HEART RATE NON-REASSURING AFFECTING MANAGEMENT OF MOTHER: Status: RESOLVED | Noted: 2025-07-03 | Resolved: 2025-07-06

## 2025-07-06 PROCEDURE — 25000003 PHARM REV CODE 250: Performed by: OBSTETRICS & GYNECOLOGY

## 2025-07-06 RX ORDER — IBUPROFEN 800 MG/1
800 TABLET, FILM COATED ORAL EVERY 8 HOURS PRN
Qty: 30 TABLET | Refills: 0 | Status: SHIPPED | OUTPATIENT
Start: 2025-07-06

## 2025-07-06 RX ORDER — OXYCODONE AND ACETAMINOPHEN 10; 325 MG/1; MG/1
1 TABLET ORAL EVERY 6 HOURS PRN
Qty: 12 TABLET | Refills: 0 | Status: SHIPPED | OUTPATIENT
Start: 2025-07-06

## 2025-07-06 RX ORDER — PRENATAL WITH FERROUS FUM AND FOLIC ACID 3080; 920; 120; 400; 22; 1.84; 3; 20; 10; 1; 12; 200; 27; 25; 2 [IU]/1; [IU]/1; MG/1; [IU]/1; MG/1; MG/1; MG/1; MG/1; MG/1; MG/1; UG/1; MG/1; MG/1; MG/1; MG/1
1 TABLET ORAL DAILY
Qty: 30 TABLET | Refills: 11 | Status: SHIPPED | OUTPATIENT
Start: 2025-07-06 | End: 2026-07-06

## 2025-07-06 RX ADMIN — DOCUSATE SODIUM 200 MG: 100 CAPSULE, LIQUID FILLED ORAL at 07:07

## 2025-07-06 RX ADMIN — IBUPROFEN 800 MG: 800 TABLET, FILM COATED ORAL at 07:07

## 2025-07-06 NOTE — DISCHARGE SUMMARY
[facility] -  Labor and Delivery  Obstetrics  Discharge Summary      Patient Name: Britney Jarrett  MRN: [id number]  Admission Date: 7/3/2025  Hospital Length of Stay: 3 days  Discharge Date and Time: 2025 10:45 AM  Attending Physician: Con Taylor MD   Discharging Provider: Fredy Benites MD   Primary Care Provider: Ann, Primary Doctor    HPI:   34 yo  c IUP @ 36+2 weeks presents to the OB ED c/o contractions increasing today and back pain in her left lower side. She was on Procardia for  contractions. This was stopped yesterday at 36 weeks. She does have a h/o delivery at 36 weeks x 2 due to  labor. She has a h/o C/S x 2.   She denies any VB or LOF. Good FM.  She denies any n/v. She feels like she is well hydrated.   Denies any dysuria or changes in Bms.  Describes the back pain as a tight muscle pain with sharp intermittent pains.  Due to the pain she did not sleep well last night.     Then during her work up the FHTs started to have recurrent prolonged decelerations to the 80s. Expedited delivery via RLTCS with bilateral salpingectomy performed      Procedure(s) (LRB):   SECTION (N/A)     Hospital Course:   25 POD# 1  Patient without complaints.  Vital signs stable afebrile.  Continue present management.        25 POD# 3  Patient without complaints  VSSAF  DC home    Fredy Benites MD  OB Hospitalist  Hospitalist phone: [phone]  2025   10:45 AM            Final Active Diagnoses:    Diagnosis Date Noted POA    PRINCIPAL PROBLEM:  Delivery by  section using transverse incision of lower segment of uterus [O82] 2025 No    History of 2  sections [Z98.891] 2025 Not Applicable    Request for sterilization [Z30.2] 2025 Not Applicable    36 weeks gestation of pregnancy [Z3A.36] 2025 Not Applicable      Problems Resolved During this Admission:    Diagnosis Date Noted Date Resolved POA     uterine  "contractions, antepartum, third trimester [O47.03] 2025 Yes    Fetal heart rate non-reassuring affecting management of mother [O36.8390] 2025 Yes        Significant Diagnostic Studies: Labs: All labs within the past 24 hours have been reviewed  Recent Labs   Lab 25  0559   HGB 9.1*   HCT 28.2*        Feeding Method: breast    Immunizations       Date Immunization Status Dose Route/Site Given by    25 MMR Incomplete 0.5 mL Subcutaneous/     25 Tdap Incomplete 0.5 mL Intramuscular/             Delivery:    Episiotomy:     Lacerations:     Repair suture:     Repair # of packets:     Blood loss (ml):       Birth information:  YOB: 2023   Time of birth: 8:53 PM   Sex: female   Delivery type: , Low Transverse   Gestational Age: 36w2d     Measurements    Weight: 3028 g  Weight (lbs): 6 lb 10.8 oz  Length: 48.3 cm  Length (in): 19"         Delivery Clinician: Delivery Providers    Delivering clinician: Con Taylor MD   Provider Role    Guera Fisher RN Circulator             Additional  information:  Forceps:    Vacuum:    Breech:    Observed anomalies      Living?:     Apgars    Living status: Living  Apgar Component Scores:  1 min.:  5 min.:  10 min.:  15 min.:  20 min.:    Skin color:  1  1       Heart rate:  2  2       Reflex irritability:  2  2       Muscle tone:  2  2       Respiratory effort:  1  2       Total:  8  9       Apgars assigned by: ROSALIA VALDES RN; ROBERTH MCDONALD         Placenta: Delivered:       appearance  Pending Diagnostic Studies:       None            Discharged Condition: good    Disposition: Home or Self Care    Follow Up:   Follow-up Information       Con Taylor MD Follow up in 1 week(s).    Specialty: Obstetrics and Gynecology  Contact information:  [address] [facility] [id number] [phone]                           Patient Instructions:   No discharge procedures on file.  Medications:  Current " Discharge Medication List        START taking these medications    Details   ibuprofen (ADVIL,MOTRIN) 800 MG tablet Take 1 tablet (800 mg total) by mouth every 8 (eight) hours as needed for Pain.  Qty: 30 tablet, Refills: 0      oxyCODONE-acetaminophen (PERCOCET)  mg per tablet Take 1 tablet by mouth every 6 (six) hours as needed for Pain.  Qty: 12 tablet, Refills: 0    Comments: n/a   Associated Diagnoses: Delivery by  section using transverse incision of lower segment of uterus      !! PNV,calcium 72/iron/folic acid (PRENATAL VITAMIN) Tab Take 1 tablet by mouth once daily.  Qty: 30 tablet, Refills: 11       !! - Potential duplicate medications found. Please discuss with provider.        CONTINUE these medications which have NOT CHANGED    Details   ferrous sulfate 325 (65 FE) MG EC tablet Take 1 tablet (325 mg total) by mouth 2 (two) times daily.  Qty: 60 tablet, Refills: 11    Associated Diagnoses: Anemia during pregnancy in third trimester      magnesium oxide (MAG-OX) 400 mg (241.3 mg magnesium) tablet Take 400 mg by mouth once daily.      !! PNV,calcium 72-iron-folic acid (PRENATAL VITAMIN PLUS LOW IRON) 27 mg iron- 1 mg Tab Take 1 tablet by mouth.      vitamin D (VITAMIN D3) 1000 units Tab Take 1,000 Units by mouth once daily.       !! - Potential duplicate medications found. Please discuss with provider.        STOP taking these medications       NIFEdipine (PROCARDIA) 10 MG Cap Comments:   Reason for Stopping:               Fredy Benites MD  Obstetrics  [facility] -  Labor and Delivery

## 2025-07-06 NOTE — PLAN OF CARE
Problem:  Fall Injury Risk  Goal: Absence of Fall, Infant Drop and Related Injury  Outcome: Met     Problem: Adult Inpatient Plan of Care  Goal: Plan of Care Review  Outcome: Met  Goal: Patient-Specific Goal (Individualized)  Outcome: Met  Goal: Absence of Hospital-Acquired Illness or Injury  Outcome: Met  Goal: Optimal Comfort and Wellbeing  Outcome: Met  Goal: Readiness for Transition of Care  Outcome: Met     Problem: Infection  Goal: Absence of Infection Signs and Symptoms  Outcome: Met     Problem: Infection  Goal: Absence of Infection Signs and Symptoms  Outcome: Met     Problem: Postpartum ( Delivery)  Goal: Successful Parent Role Transition  Outcome: Met  Goal: Hemostasis  Outcome: Met  Goal: Effective Bowel Elimination  Outcome: Met  Goal: Fluid and Electrolyte Balance  Outcome: Met  Goal: Absence of Infection Signs and Symptoms  Outcome: Met  Goal: Anesthesia/Sedation Recovery  Outcome: Met  Goal: Optimal Pain Control and Function  Outcome: Met  Goal: Nausea and Vomiting Relief  Outcome: Met  Goal: Effective Urinary Elimination  Outcome: Met  Goal: Effective Oxygenation and Ventilation  Outcome: Met     Problem: Wound  Goal: Optimal Coping  Outcome: Met  Goal: Optimal Functional Ability  Outcome: Met  Goal: Absence of Infection Signs and Symptoms  Outcome: Met  Goal: Improved Oral Intake  Outcome: Met  Goal: Optimal Pain Control and Function  Outcome: Met  Goal: Skin Health and Integrity  Outcome: Met  Goal: Optimal Wound Healing  Outcome: Met

## 2025-07-06 NOTE — PLAN OF CARE
Problem:  Fall Injury Risk  Goal: Absence of Fall, Infant Drop and Related Injury  Outcome: Progressing     Problem: Infection  Goal: Absence of Infection Signs and Symptoms  Outcome: Progressing     Problem: Postpartum ( Delivery)  Goal: Successful Parent Role Transition  Outcome: Progressing  Goal: Hemostasis  Outcome: Progressing  Goal: Effective Bowel Elimination  Outcome: Progressing  Goal: Fluid and Electrolyte Balance  Outcome: Progressing  Goal: Absence of Infection Signs and Symptoms  Outcome: Progressing  Goal: Anesthesia/Sedation Recovery  Outcome: Progressing  Goal: Optimal Pain Control and Function  Outcome: Progressing  Goal: Nausea and Vomiting Relief  Outcome: Progressing  Goal: Effective Urinary Elimination  Outcome: Progressing  Goal: Effective Oxygenation and Ventilation  Outcome: Progressing     Problem: Wound  Goal: Optimal Coping  Outcome: Progressing  Goal: Optimal Functional Ability  Outcome: Progressing  Goal: Absence of Infection Signs and Symptoms  Outcome: Progressing  Goal: Improved Oral Intake  Outcome: Progressing  Goal: Optimal Pain Control and Function  Outcome: Progressing  Goal: Skin Health and Integrity  Outcome: Progressing  Goal: Optimal Wound Healing  Outcome: Progressing

## 2025-07-08 ENCOUNTER — TELEPHONE (OUTPATIENT)
Dept: OBSTETRICS AND GYNECOLOGY | Facility: CLINIC | Age: 35
End: 2025-07-08
Payer: COMMERCIAL

## 2025-07-08 LAB
ESTROGEN SERPL-MCNC: NORMAL PG/ML
INSULIN SERPL-ACNC: NORMAL U[IU]/ML
LAB AP CLINICAL INFORMATION: NORMAL
LAB AP GESTATIONAL AGE: NORMAL
LAB AP GROSS DESCRIPTION: NORMAL
LAB AP LABORATORY NOTES: NORMAL
T3RU NFR SERPL: NORMAL %

## 2025-07-08 NOTE — TELEPHONE ENCOUNTER
Spoke with pt and explained I would convert the appt to a postpartum and send Dr. Taylor a message to see if she needed to see her also or if she was ok to just see Karli.

## 2025-07-08 NOTE — ANESTHESIA PREPROCEDURE EVALUATION
2025  Britney Jarrett is a 35 y.o., female.      Pre-op Assessment    I have reviewed the Patient Summary Reports.    I have reviewed the NPO Status.   I have reviewed the Medications.     Review of Systems         Anesthesia Plan  Type of Anesthesia, risks & benefits discussed:    Anesthesia Type: Spinal  Intra-op Monitoring Plan: Standard ASA Monitors  Informed Consent: Informed consent signed with the Patient and all parties understand the risks and agree with anesthesia plan.  All questions answered.   ASA Score: 2    Ready For Surgery From Anesthesia Perspective.     .   at 36 weeks for emergent   Fetal distress  Denies other medical conditions    MP II; adequate ROM of neck    Plan is GETA

## 2025-07-08 NOTE — ANESTHESIA POSTPROCEDURE EVALUATION
Anesthesia Post Evaluation    Patient: Britney Jarrett    Procedure(s) Performed: Procedure(s) (LRB):   SECTION (N/A)    Final Anesthesia Type: general      Patient location during evaluation: labor & delivery  Post-procedure vital signs: reviewed and stable  Pain management: adequate  Airway patency: patent    PONV status at discharge: No PONV  Anesthetic complications: no      Cardiovascular status: hemodynamically stable  Respiratory status: unassisted  Hydration status: euvolemic  Follow-up not needed.              Vitals Value Taken Time   /68 25 07:16   Temp 36.6 °C (97.9 °F) 25 07:16   Pulse 79 25 07:16   Resp 18 25 07:16   SpO2 99 % 25 07:16         Event Time   Out of Recovery 2025 23:45:00         Pain/Isidro Score: No data recorded

## 2025-07-09 ENCOUNTER — POSTPARTUM VISIT (OUTPATIENT)
Dept: OBSTETRICS AND GYNECOLOGY | Facility: CLINIC | Age: 35
End: 2025-07-09
Payer: COMMERCIAL

## 2025-07-09 VITALS
HEART RATE: 82 BPM | OXYGEN SATURATION: 99 % | RESPIRATION RATE: 18 BRPM | WEIGHT: 203 LBS | BODY MASS INDEX: 37.36 KG/M2 | HEIGHT: 62 IN | SYSTOLIC BLOOD PRESSURE: 122 MMHG | DIASTOLIC BLOOD PRESSURE: 80 MMHG

## 2025-07-09 DIAGNOSIS — Z98.891 STATUS POST CESAREAN SECTION: ICD-10-CM

## 2025-07-09 DIAGNOSIS — Z98.890 HISTORY OF FEMALE STERILIZATION: ICD-10-CM

## 2025-07-09 DIAGNOSIS — Z09 POSTOP CHECK: Primary | ICD-10-CM

## 2025-07-09 PROCEDURE — 99999 PR PBB SHADOW E&M-EST. PATIENT-LVL IV: CPT | Mod: PBBFAC,,, | Performed by: ADVANCED PRACTICE MIDWIFE

## 2025-07-09 PROCEDURE — 99214 OFFICE O/P EST MOD 30 MIN: CPT | Mod: PBBFAC | Performed by: ADVANCED PRACTICE MIDWIFE

## 2025-07-09 PROCEDURE — 0503F POSTPARTUM CARE VISIT: CPT | Mod: ,,, | Performed by: ADVANCED PRACTICE MIDWIFE

## 2025-07-09 NOTE — PROGRESS NOTES
"  Postop Note    Subjective:       Britney Jarrett is a 35 y.o. female who presents to the clinic 1 weeks status post , tubal sterilization for spontaneous labor with NR FHTs, repeat . Eating a regular diet without difficulty. Voiding without difficulty. Bowel movements are normal. Pain is controlled with current analgesics. Medications being used: ibuprofen (OTC) and percocet.  Infant is doing well, breast.  Denies depression or anxiety.     The following portions of the patient's history were reviewed and updated as appropriate: allergies, current medications, past family history, past medical history, past social history, past surgical history, and problem list.    Review of Systems  Pertinent items are noted in HPI.      OB History    Para Term  AB Living   6 3 0 3 3 3   SAB IAB Ectopic Multiple Live Births   1 1 0 0 3      # Outcome Date GA Lbr Raul/2nd Weight Sex Type Anes PTL Lv   6  25 36w2d  3.028 kg (6 lb 10.8 oz) F CS-LTranv Gen  VALENTIN      Name: Henrietta Jarrett      Apgar1: 8  Apgar5: 9   5  23 36w0d  2.608 kg (5 lb 12 oz) M CS-LTranv Spinal Y VALENTIN      Complications: Pre-eclampsia   4  / 36w0d  2.637 kg (5 lb 13 oz) M CS-LTranv EPI  VALENTIN      Birth Comments: Nuchal cord x3   3 AB            2 IAB            1 SAB              Objective:      /80 (BP Location: Right arm, Patient Position: Sitting)   Pulse 82   Resp 18   Ht 5' 2" (1.575 m)   Wt 92.1 kg (203 lb)   LMP 10/03/2024   SpO2 99%   Breastfeeding Yes   BMI 37.13 kg/m²   General:  alert, appears stated age, cooperative, and no distress   Abdomen: soft, bowel sounds active, non-tender   Incision:   healing well, no drainage, no erythema, no hernia, no seroma, no swelling, no dehiscence, incision well approximated.  MINH dressing removed       Extremities: 1-2+ edema lower extrem    Assessment:     1. Postop check        2. Status post  section        3. History " of female sterilization        4. Lactating mother          Plan:      1. Continue any current medications.  2. Wound care discussed.  3. Activity restrictions: no lifting more than 10 pounds  4. Anticipated return to work: not applicable.  5. Follow up: 5 weeks for postpartum visit and pap.

## 2025-07-24 ENCOUNTER — PATIENT MESSAGE (OUTPATIENT)
Dept: OBSTETRICS AND GYNECOLOGY | Facility: CLINIC | Age: 35
End: 2025-07-24
Payer: COMMERCIAL

## 2025-08-18 PROBLEM — Z30.2 REQUEST FOR STERILIZATION: Status: RESOLVED | Noted: 2025-07-03 | Resolved: 2025-08-18

## 2025-08-18 PROBLEM — Z98.891 HISTORY OF 2 CESAREAN SECTIONS: Status: RESOLVED | Noted: 2025-07-03 | Resolved: 2025-08-18

## (undated) DEVICE — APPLICATOR CHLORAPREP ORN 26ML

## (undated) DEVICE — GLOVE 7.5 PROTEXIS PI BLUE

## (undated) DEVICE — KIT PROVENT ABG LL 23G 1IN 3CC

## (undated) DEVICE — SUT MONOCRYL 4-0 SH UND MON

## (undated) DEVICE — SOL SALINE STER BOTTLE 500ML

## (undated) DEVICE — SUT MONO 1 OBGYN 36IN CT1

## (undated) DEVICE — GLOVE 6.5 PROTEXIS PI BLUE

## (undated) DEVICE — Device

## (undated) DEVICE — SUT 0 60IN PDS II VIO MONO

## (undated) DEVICE — GLOVE PROTEXIS PI SYN SURG 6.5

## (undated) DEVICE — SUT CHROMIC 2-0 CT1 36IN BR

## (undated) DEVICE — PACK C SECTION RUSH

## (undated) DEVICE — DRESSING PICO 7 TWO 10X40CM

## (undated) DEVICE — SUT PLAIN GUT 2-0